# Patient Record
Sex: MALE | Race: BLACK OR AFRICAN AMERICAN | Employment: UNEMPLOYED | URBAN - METROPOLITAN AREA
[De-identification: names, ages, dates, MRNs, and addresses within clinical notes are randomized per-mention and may not be internally consistent; named-entity substitution may affect disease eponyms.]

---

## 2022-01-01 ENCOUNTER — HOSPITAL ENCOUNTER (INPATIENT)
Facility: CLINIC | Age: 42
LOS: 4 days | Discharge: HOME | DRG: 637 | End: 2022-01-06
Attending: EMERGENCY MEDICINE | Admitting: FAMILY MEDICINE
Payer: COMMERCIAL

## 2022-01-01 DIAGNOSIS — H66.012 NON-RECURRENT ACUTE SUPPURATIVE OTITIS MEDIA OF LEFT EAR WITH SPONTANEOUS RUPTURE OF TYMPANIC MEMBRANE: ICD-10-CM

## 2022-01-01 DIAGNOSIS — E11.9 TYPE 2 DIABETES MELLITUS WITHOUT COMPLICATION, WITH LONG-TERM CURRENT USE OF INSULIN (H): ICD-10-CM

## 2022-01-01 DIAGNOSIS — R76.8 COVID-19 VIRUS ANTIBODY DETECTED: ICD-10-CM

## 2022-01-01 DIAGNOSIS — H70.92 MASTOIDITIS OF LEFT SIDE: Primary | ICD-10-CM

## 2022-01-01 DIAGNOSIS — U07.1 LAB TEST POSITIVE FOR DETECTION OF COVID-19 VIRUS: ICD-10-CM

## 2022-01-01 DIAGNOSIS — Z79.4 TYPE 2 DIABETES MELLITUS WITHOUT COMPLICATION, WITH LONG-TERM CURRENT USE OF INSULIN (H): ICD-10-CM

## 2022-01-01 DIAGNOSIS — R73.9 HYPERGLYCEMIA: ICD-10-CM

## 2022-01-01 DIAGNOSIS — F17.200 TOBACCO USE DISORDER: ICD-10-CM

## 2022-01-01 DIAGNOSIS — G57.93 NEUROPATHIC PAIN OF BOTH FEET: ICD-10-CM

## 2022-01-01 LAB
ABO/RH(D): NORMAL
ALBUMIN SERPL-MCNC: 2.6 G/DL (ref 3.4–5)
ALP SERPL-CCNC: 95 U/L (ref 40–150)
ALT SERPL W P-5'-P-CCNC: 19 U/L (ref 0–70)
ANION GAP SERPL CALCULATED.3IONS-SCNC: 6 MMOL/L (ref 3–14)
APTT PPP: 30 SECONDS (ref 22–38)
AST SERPL W P-5'-P-CCNC: 10 U/L (ref 0–45)
BASOPHILS # BLD AUTO: 0 10E3/UL (ref 0–0.2)
BASOPHILS NFR BLD AUTO: 0 %
BILIRUB SERPL-MCNC: 0.2 MG/DL (ref 0.2–1.3)
BUN SERPL-MCNC: 12 MG/DL (ref 7–30)
CALCIUM SERPL-MCNC: 9.8 MG/DL (ref 8.5–10.1)
CHLORIDE BLD-SCNC: 91 MMOL/L (ref 94–109)
CO2 SERPL-SCNC: 33 MMOL/L (ref 20–32)
CREAT SERPL-MCNC: 0.55 MG/DL (ref 0.66–1.25)
CRP SERPL-MCNC: 210 MG/L (ref 0–8)
D DIMER PPP FEU-MCNC: 0.46 UG/ML FEU (ref 0–0.5)
EOSINOPHIL # BLD AUTO: 0 10E3/UL (ref 0–0.7)
EOSINOPHIL NFR BLD AUTO: 0 %
ERYTHROCYTE [DISTWIDTH] IN BLOOD BY AUTOMATED COUNT: 11.9 % (ref 10–15)
FERRITIN SERPL-MCNC: 379 NG/ML (ref 26–388)
FIBRINOGEN PPP-MCNC: 764 MG/DL (ref 170–490)
GFR SERPL CREATININE-BSD FRML MDRD: >90 ML/MIN/1.73M2
GLUCOSE BLD-MCNC: 604 MG/DL (ref 70–99)
GLUCOSE BLDC GLUCOMTR-MCNC: 368 MG/DL (ref 70–99)
HBA1C MFR BLD: 13.7 % (ref 0–5.6)
HCT VFR BLD AUTO: 43.2 % (ref 40–53)
HGB BLD-MCNC: 14.6 G/DL (ref 13.3–17.7)
IMM GRANULOCYTES # BLD: 0 10E3/UL
IMM GRANULOCYTES NFR BLD: 0 %
INR PPP: 1.08 (ref 0.85–1.15)
LDH SERPL L TO P-CCNC: 145 U/L (ref 85–227)
LYMPHOCYTES # BLD AUTO: 1.4 10E3/UL (ref 0.8–5.3)
LYMPHOCYTES NFR BLD AUTO: 20 %
MCH RBC QN AUTO: 29.9 PG (ref 26.5–33)
MCHC RBC AUTO-ENTMCNC: 33.8 G/DL (ref 31.5–36.5)
MCV RBC AUTO: 89 FL (ref 78–100)
MONOCYTES # BLD AUTO: 0.8 10E3/UL (ref 0–1.3)
MONOCYTES NFR BLD AUTO: 12 %
NEUTROPHILS # BLD AUTO: 4.7 10E3/UL (ref 1.6–8.3)
NEUTROPHILS NFR BLD AUTO: 68 %
NRBC # BLD AUTO: 0 10E3/UL
NRBC BLD AUTO-RTO: 0 /100
PLATELET # BLD AUTO: 291 10E3/UL (ref 150–450)
POTASSIUM BLD-SCNC: 4.3 MMOL/L (ref 3.4–5.3)
PROT SERPL-MCNC: 7.4 G/DL (ref 6.8–8.8)
RBC # BLD AUTO: 4.88 10E6/UL (ref 4.4–5.9)
RETICS # AUTO: 0.03 10E6/UL (ref 0.03–0.1)
RETICS/RBC NFR AUTO: 0.7 % (ref 0.5–2)
SARS-COV-2 RNA RESP QL NAA+PROBE: POSITIVE
SODIUM SERPL-SCNC: 130 MMOL/L (ref 133–144)
SPECIMEN EXPIRATION DATE: NORMAL
TROPONIN I SERPL HS-MCNC: <3 NG/L
WBC # BLD AUTO: 6.9 10E3/UL (ref 4–11)

## 2022-01-01 PROCEDURE — 99284 EMERGENCY DEPT VISIT MOD MDM: CPT | Performed by: EMERGENCY MEDICINE

## 2022-01-01 PROCEDURE — 85610 PROTHROMBIN TIME: CPT | Performed by: STUDENT IN AN ORGANIZED HEALTH CARE EDUCATION/TRAINING PROGRAM

## 2022-01-01 PROCEDURE — U0005 INFEC AGEN DETEC AMPLI PROBE: HCPCS | Performed by: EMERGENCY MEDICINE

## 2022-01-01 PROCEDURE — 250N000013 HC RX MED GY IP 250 OP 250 PS 637: Performed by: EMERGENCY MEDICINE

## 2022-01-01 PROCEDURE — 82962 GLUCOSE BLOOD TEST: CPT

## 2022-01-01 PROCEDURE — 96365 THER/PROPH/DIAG IV INF INIT: CPT | Performed by: EMERGENCY MEDICINE

## 2022-01-01 PROCEDURE — 36415 COLL VENOUS BLD VENIPUNCTURE: CPT | Performed by: EMERGENCY MEDICINE

## 2022-01-01 PROCEDURE — G0378 HOSPITAL OBSERVATION PER HR: HCPCS

## 2022-01-01 PROCEDURE — 86140 C-REACTIVE PROTEIN: CPT | Performed by: STUDENT IN AN ORGANIZED HEALTH CARE EDUCATION/TRAINING PROGRAM

## 2022-01-01 PROCEDURE — 85045 AUTOMATED RETICULOCYTE COUNT: CPT | Performed by: STUDENT IN AN ORGANIZED HEALTH CARE EDUCATION/TRAINING PROGRAM

## 2022-01-01 PROCEDURE — 82728 ASSAY OF FERRITIN: CPT | Performed by: STUDENT IN AN ORGANIZED HEALTH CARE EDUCATION/TRAINING PROGRAM

## 2022-01-01 PROCEDURE — 250N000009 HC RX 250: Performed by: STUDENT IN AN ORGANIZED HEALTH CARE EDUCATION/TRAINING PROGRAM

## 2022-01-01 PROCEDURE — 85379 FIBRIN DEGRADATION QUANT: CPT | Performed by: STUDENT IN AN ORGANIZED HEALTH CARE EDUCATION/TRAINING PROGRAM

## 2022-01-01 PROCEDURE — 83036 HEMOGLOBIN GLYCOSYLATED A1C: CPT | Performed by: EMERGENCY MEDICINE

## 2022-01-01 PROCEDURE — 96361 HYDRATE IV INFUSION ADD-ON: CPT | Performed by: EMERGENCY MEDICINE

## 2022-01-01 PROCEDURE — C9803 HOPD COVID-19 SPEC COLLECT: HCPCS | Performed by: EMERGENCY MEDICINE

## 2022-01-01 PROCEDURE — 85004 AUTOMATED DIFF WBC COUNT: CPT | Performed by: EMERGENCY MEDICINE

## 2022-01-01 PROCEDURE — 258N000003 HC RX IP 258 OP 636: Performed by: STUDENT IN AN ORGANIZED HEALTH CARE EDUCATION/TRAINING PROGRAM

## 2022-01-01 PROCEDURE — 250N000009 HC RX 250: Performed by: NURSE PRACTITIONER

## 2022-01-01 PROCEDURE — 86901 BLOOD TYPING SEROLOGIC RH(D): CPT | Performed by: STUDENT IN AN ORGANIZED HEALTH CARE EDUCATION/TRAINING PROGRAM

## 2022-01-01 PROCEDURE — 80053 COMPREHEN METABOLIC PANEL: CPT | Performed by: EMERGENCY MEDICINE

## 2022-01-01 PROCEDURE — 85730 THROMBOPLASTIN TIME PARTIAL: CPT | Performed by: STUDENT IN AN ORGANIZED HEALTH CARE EDUCATION/TRAINING PROGRAM

## 2022-01-01 PROCEDURE — 36415 COLL VENOUS BLD VENIPUNCTURE: CPT | Performed by: STUDENT IN AN ORGANIZED HEALTH CARE EDUCATION/TRAINING PROGRAM

## 2022-01-01 PROCEDURE — 84484 ASSAY OF TROPONIN QUANT: CPT | Performed by: STUDENT IN AN ORGANIZED HEALTH CARE EDUCATION/TRAINING PROGRAM

## 2022-01-01 PROCEDURE — 85384 FIBRINOGEN ACTIVITY: CPT | Performed by: STUDENT IN AN ORGANIZED HEALTH CARE EDUCATION/TRAINING PROGRAM

## 2022-01-01 PROCEDURE — 258N000003 HC RX IP 258 OP 636: Performed by: EMERGENCY MEDICINE

## 2022-01-01 PROCEDURE — 99285 EMERGENCY DEPT VISIT HI MDM: CPT | Mod: 25 | Performed by: EMERGENCY MEDICINE

## 2022-01-01 PROCEDURE — 83615 LACTATE (LD) (LDH) ENZYME: CPT | Performed by: STUDENT IN AN ORGANIZED HEALTH CARE EDUCATION/TRAINING PROGRAM

## 2022-01-01 PROCEDURE — 96366 THER/PROPH/DIAG IV INF ADDON: CPT | Performed by: EMERGENCY MEDICINE

## 2022-01-01 RX ORDER — DEXTROSE MONOHYDRATE 100 MG/ML
INJECTION, SOLUTION INTRAVENOUS CONTINUOUS PRN
Status: DISCONTINUED | OUTPATIENT
Start: 2022-01-01 | End: 2022-01-02

## 2022-01-01 RX ORDER — DEXTROSE MONOHYDRATE 25 G/50ML
25-50 INJECTION, SOLUTION INTRAVENOUS
Status: DISCONTINUED | OUTPATIENT
Start: 2022-01-01 | End: 2022-01-02

## 2022-01-01 RX ORDER — IBUPROFEN 600 MG/1
600 TABLET, FILM COATED ORAL 3 TIMES DAILY PRN
Status: DISCONTINUED | OUTPATIENT
Start: 2022-01-01 | End: 2022-01-01

## 2022-01-01 RX ORDER — AMOXICILLIN AND CLAVULANATE POTASSIUM 500; 125 MG/1; MG/1
1 TABLET, FILM COATED ORAL 3 TIMES DAILY
Status: DISCONTINUED | OUTPATIENT
Start: 2022-01-01 | End: 2022-01-01

## 2022-01-01 RX ORDER — ACETAMINOPHEN 500 MG
1000 TABLET ORAL ONCE
Status: COMPLETED | OUTPATIENT
Start: 2022-01-01 | End: 2022-01-01

## 2022-01-01 RX ORDER — LIDOCAINE 40 MG/G
CREAM TOPICAL
Status: DISCONTINUED | OUTPATIENT
Start: 2022-01-01 | End: 2022-01-02

## 2022-01-01 RX ORDER — POLYETHYLENE GLYCOL 3350 17 G/17G
17 POWDER, FOR SOLUTION ORAL DAILY PRN
Status: DISCONTINUED | OUTPATIENT
Start: 2022-01-01 | End: 2022-01-06 | Stop reason: HOSPADM

## 2022-01-01 RX ORDER — DEXTROSE MONOHYDRATE 25 G/50ML
25-50 INJECTION, SOLUTION INTRAVENOUS
Status: DISCONTINUED | OUTPATIENT
Start: 2022-01-01 | End: 2022-01-01

## 2022-01-01 RX ORDER — ONDANSETRON 2 MG/ML
4 INJECTION INTRAMUSCULAR; INTRAVENOUS EVERY 6 HOURS PRN
Status: DISCONTINUED | OUTPATIENT
Start: 2022-01-01 | End: 2022-01-06 | Stop reason: HOSPADM

## 2022-01-01 RX ORDER — ACETAMINOPHEN 325 MG/1
650 TABLET ORAL EVERY 6 HOURS PRN
Status: DISCONTINUED | OUTPATIENT
Start: 2022-01-01 | End: 2022-01-06 | Stop reason: HOSPADM

## 2022-01-01 RX ORDER — CALCIUM CARBONATE 500 MG/1
1000 TABLET, CHEWABLE ORAL 4 TIMES DAILY PRN
Status: DISCONTINUED | OUTPATIENT
Start: 2022-01-01 | End: 2022-01-06 | Stop reason: HOSPADM

## 2022-01-01 RX ORDER — SODIUM CHLORIDE 9 MG/ML
INJECTION, SOLUTION INTRAVENOUS CONTINUOUS
Status: DISCONTINUED | OUTPATIENT
Start: 2022-01-01 | End: 2022-01-02

## 2022-01-01 RX ORDER — NICOTINE POLACRILEX 4 MG
15-30 LOZENGE BUCCAL
Status: DISCONTINUED | OUTPATIENT
Start: 2022-01-01 | End: 2022-01-02

## 2022-01-01 RX ORDER — SODIUM CHLORIDE 9 MG/ML
INJECTION, SOLUTION INTRAVENOUS CONTINUOUS
Status: DISCONTINUED | OUTPATIENT
Start: 2022-01-01 | End: 2022-01-01

## 2022-01-01 RX ORDER — LIDOCAINE 40 MG/G
CREAM TOPICAL
Status: DISCONTINUED | OUTPATIENT
Start: 2022-01-01 | End: 2022-01-06 | Stop reason: HOSPADM

## 2022-01-01 RX ORDER — DEXTROSE MONOHYDRATE 100 MG/ML
INJECTION, SOLUTION INTRAVENOUS CONTINUOUS PRN
Status: DISCONTINUED | OUTPATIENT
Start: 2022-01-01 | End: 2022-01-01

## 2022-01-01 RX ORDER — NICOTINE POLACRILEX 4 MG
15-30 LOZENGE BUCCAL
Status: DISCONTINUED | OUTPATIENT
Start: 2022-01-01 | End: 2022-01-01

## 2022-01-01 RX ORDER — ACETAMINOPHEN 325 MG/1
650 TABLET ORAL EVERY 6 HOURS PRN
Status: DISCONTINUED | OUTPATIENT
Start: 2022-01-01 | End: 2022-01-01

## 2022-01-01 RX ORDER — ONDANSETRON 4 MG/1
4 TABLET, ORALLY DISINTEGRATING ORAL EVERY 6 HOURS PRN
Status: DISCONTINUED | OUTPATIENT
Start: 2022-01-01 | End: 2022-01-06 | Stop reason: HOSPADM

## 2022-01-01 RX ADMIN — SODIUM CHLORIDE: 9 INJECTION, SOLUTION INTRAVENOUS at 17:55

## 2022-01-01 RX ADMIN — ACETAMINOPHEN 1000 MG: 500 TABLET ORAL at 15:39

## 2022-01-01 RX ADMIN — AMOXICILLIN AND CLAVULANATE POTASSIUM 1 TABLET: 875; 125 TABLET, FILM COATED ORAL at 15:39

## 2022-01-01 RX ADMIN — SODIUM CHLORIDE 1000 ML: 9 INJECTION, SOLUTION INTRAVENOUS at 17:17

## 2022-01-01 RX ADMIN — HUMAN INSULIN 9 UNITS/HR: 100 INJECTION, SOLUTION SUBCUTANEOUS at 20:59

## 2022-01-01 RX ADMIN — HUMAN INSULIN 7 UNITS/HR: 100 INJECTION, SOLUTION SUBCUTANEOUS at 20:04

## 2022-01-01 RX ADMIN — SODIUM CHLORIDE: 9 INJECTION, SOLUTION INTRAVENOUS at 21:32

## 2022-01-01 RX ADMIN — HUMAN INSULIN 3 UNITS/HR: 100 INJECTION, SOLUTION SUBCUTANEOUS at 21:31

## 2022-01-01 RX ADMIN — HUMAN INSULIN 5 UNITS/HR: 100 INJECTION, SOLUTION SUBCUTANEOUS at 17:54

## 2022-01-01 ASSESSMENT — ENCOUNTER SYMPTOMS
SHORTNESS OF BREATH: 0
COUGH: 0
NAUSEA: 0
VOMITING: 0

## 2022-01-01 ASSESSMENT — MIFFLIN-ST. JEOR: SCORE: 1509.07

## 2022-01-01 NOTE — LETTER
[facility] [address] [address]  [phone]  [phone]      1/6/2022    Re: Mahsa Torrez      TO WHOM IT MAY CONCERN:    Mahsa Torrez  was in the hospital from 1/3/2022-1/6/2022.  Please excuse him from work until 1/13/2022 due to illness. Please call if you have further questions or concerns.       Cordially,    May Mai MD

## 2022-01-01 NOTE — ED PROVIDER NOTES
[place] EMERGENCY DEPARTMENT ([facility])  1/01/22  History     Chief Complaint   Patient presents with     Otalgia     Dizziness     The history is provided by the patient and medical records.     Mahsa Torrez is a 42 year old male who presents to the emerge department with complaints of right ear pain and drainage.  Patient states he flew [place] to [place] over the last week and when landing in [place] he felt his ear pop on the left side.  Patient apparently was seen in an emergency department there and given antibiotics but only took 1 dose while in the ER.  The patient states that since that time he has had persistent drainage. He has had generalized malaise, posterior ear pain on the left. No nausea, no vomiting, no coughing. He states that he has had a negative Covid test for travel and he denies any shortness of breath.  Patient does state that his feet burn bilaterally.  Patient denies any history of diabetes but is unsure if he has any other underlying illnesses.    No past medical history on file.    No past surgical history on file.    No family history on file.    Social History     Tobacco Use     Smoking status: Current Some Day Smoker     Smokeless tobacco: Never Used   Substance Use Topics     Alcohol use: Not on file      No Known Allergies        Review of your medicines      None currently       I have reviewed the Medications, Allergies, Past Medical and Surgical History, and Social History in the Epic system.    Review of Systems   Constitutional:        Positive for malaise     HENT: Positive for ear discharge (L) and ear pain (L).    Respiratory: Negative for cough and shortness of breath.    Gastrointestinal: Negative for nausea and vomiting.   Musculoskeletal:        Positive for bilateral feet burning   All other systems reviewed and are negative.    A complete review of systems was performed with pertinent positives and negatives noted in the HPI, and all other  "systems negative.    Physical Exam   BP: 138/85  Pulse: 104  Temp: 99.9  F (37.7  C)  Resp: 16  Height: 167.6 cm (5' 6\")  Weight: 67.1 kg (148 lb)  SpO2: 96 %      Physical Exam  Vitals reviewed.   Constitutional:       Appearance: He is not ill-appearing or diaphoretic.      Comments: Conversant pleasant   HENT:      Right Ear: Tympanic membrane normal.      Ears:      Comments: Patient's left ear has copious amounts of discharge in the canal and the TM is unable to be visualized but most likely is spontaneously ruptured     Mouth/Throat:      Pharynx: Oropharynx is clear.   Eyes:      Extraocular Movements: Extraocular movements intact.      Pupils: Pupils are equal, round, and reactive to light.   Cardiovascular:      Rate and Rhythm: Regular rhythm.      Heart sounds: Normal heart sounds.   Musculoskeletal:         General: No deformity.      Cervical back: Neck supple.   Neurological:      General: No focal deficit present.      Mental Status: He is alert and oriented to person, place, and time.   Psychiatric:         Mood and Affect: Mood normal.         ED Course     At 3:51 PM the patient was seen and examined by Jaun Ruiz MD in Room ED07.        Procedures         Patient was initially given an oral dose of Augmentin and because of his feet paresthesias with burning did have laboratories done    Results for orders placed or performed during the hospital encounter of 01/01/22 (from the past 24 hour(s))   CBC with platelets differential    Narrative    The following orders were created for panel order CBC with platelets differential.  Procedure                               Abnormality         Status                     ---------                               -----------         ------                     CBC with platelets and d...[[id number]]                      Final result                 Please view results for these tests on the individual orders.   Comprehensive metabolic panel   Result " Value Ref Range    Sodium 130 (L) 133 - 144 mmol/L    Potassium 4.3 3.4 - 5.3 mmol/L    Chloride 91 (L) 94 - 109 mmol/L    Carbon Dioxide (CO2) 33 (H) 20 - 32 mmol/L    Anion Gap 6 3 - 14 mmol/L    Urea Nitrogen 12 7 - 30 mg/dL    Creatinine 0.55 (L) 0.66 - 1.25 mg/dL    Calcium 9.8 8.5 - 10.1 mg/dL    Glucose 604 (HH) 70 - 99 mg/dL    Alkaline Phosphatase 95 40 - 150 U/L    AST 10 0 - 45 U/L    ALT 19 0 - 70 U/L    Protein Total 7.4 6.8 - 8.8 g/dL    Albumin 2.6 (L) 3.4 - 5.0 g/dL    Bilirubin Total 0.2 0.2 - 1.3 mg/dL    GFR Estimate >90 >60 mL/min/1.73m2   CBC with platelets and differential   Result Value Ref Range    WBC Count 6.9 4.0 - 11.0 10e3/uL    RBC Count 4.88 4.40 - 5.90 10e6/uL    Hemoglobin 14.6 13.3 - 17.7 g/dL    Hematocrit 43.2 40.0 - 53.0 %    MCV 89 78 - 100 fL    MCH 29.9 26.5 - 33.0 pg    MCHC 33.8 31.5 - 36.5 g/dL    RDW 11.9 10.0 - 15.0 %    Platelet Count 291 150 - 450 10e3/uL    % Neutrophils 68 %    % Lymphocytes 20 %    % Monocytes 12 %    % Eosinophils 0 %    % Basophils 0 %    % Immature Granulocytes 0 %    NRBCs per 100 WBC 0 <1 /100    Absolute Neutrophils 4.7 1.6 - 8.3 10e3/uL    Absolute Lymphocytes 1.4 0.8 - 5.3 10e3/uL    Absolute Monocytes 0.8 0.0 - 1.3 10e3/uL    Absolute Eosinophils 0.0 0.0 - 0.7 10e3/uL    Absolute Basophils 0.0 0.0 - 0.2 10e3/uL    Absolute Immature Granulocytes 0.0 <=0.4 10e3/uL    Absolute NRBCs 0.0 10e3/uL       Medications   0.9% sodium chloride BOLUS (has no administration in time range)   sodium chloride 0.9% infusion (has no administration in time range)   amoxicillin-clavulanate (AUGMENTIN) 500-125 MG per tablet 1 tablet (has no administration in time range)   acetaminophen (TYLENOL) tablet 650 mg (has no administration in time range)   ibuprofen (ADVIL/MOTRIN) tablet 600 mg (has no administration in time range)   amoxicillin-clavulanate (AUGMENTIN) 875-125 MG per tablet 1 tablet (1 tablet Oral Given 1/1/22 8783)   acetaminophen (TYLENOL) tablet 1,000  mg (1,000 mg Oral Given 1/1/22 1539)              Assessments & Plan (with Medical Decision Making)     I have reviewed the nursing notes.    After the first dose of Augmentin the patient's labs came back showing a glucose of 600.  Most likely this represents new onset diabetes as the patient has no history that he is aware of of having diabetes.  Patient will be kept in our observation unit and be given some IV fluids here and started on insulin drip to help assess his insulin requirements.  Hemoglobin A1c was added to his labs as well.    I have reviewed the findings, diagnosis, and plan with the patient.        Final diagnoses:   Non-recurrent acute suppurative otitis media of left ear with spontaneous rupture of tympanic membrane   Hyperglycemia     Jaun Ruiz MD, MD    IAbi am serving as a trained medical scribe to document services personally performed by Jaun Ruiz MD, based on the provider's statements to me.      IJaun MD, was physically present and have reviewed and verified the accuracy of this note documented by Abi Montez.     Jaun Ruiz MD  1/1/2022   [facility] EMERGENCY DEPARTMENT     Jaun Ruiz MD  01/01/22 1710      Addendum 1920  Patient's Covid test came back positive so at this point the patient will be admitted to a medicine observation bed.    Final diagnoses:   Non-recurrent acute suppurative otitis media of left ear with spontaneous rupture of tympanic membrane   Hyperglycemia - new onset diabetes   COVID-19 virus antibody detected     Jaun Ruiz MD, Jaun Sotomayor MD  01/01/22 1922

## 2022-01-01 NOTE — ED TRIAGE NOTES
"Patient presents ambulatory to triage with complaints of left ear pain, headache and dizziness. Patient reports that he also has back pain and \"feet burning.\" Patient reports symptoms started one week ago while he was in [place] for which he was seen in an ER. Patient reports that he was given a dose of antibiotics at the hospital, but never received a prescription for further treatment.   "

## 2022-01-01 NOTE — LETTER
January 2, 2022      Mahsa Torrez  [address] [address]        To Whom it May Concern,    Mr. Mahsa Torrez was hospitalized from 1/1/22- ***. He will require isolation until 1/11/21

## 2022-01-02 ENCOUNTER — APPOINTMENT (OUTPATIENT)
Dept: CT IMAGING | Facility: CLINIC | Age: 42
DRG: 637 | End: 2022-01-02
Attending: STUDENT IN AN ORGANIZED HEALTH CARE EDUCATION/TRAINING PROGRAM

## 2022-01-02 PROBLEM — H70.92 MASTOIDITIS OF LEFT SIDE: Status: ACTIVE | Noted: 2022-01-02

## 2022-01-02 LAB
ANION GAP SERPL CALCULATED.3IONS-SCNC: 9 MMOL/L (ref 3–14)
BUN SERPL-MCNC: 8 MG/DL (ref 7–30)
CALCIUM SERPL-MCNC: 9.2 MG/DL (ref 8.5–10.1)
CHLORIDE BLD-SCNC: 100 MMOL/L (ref 94–109)
CHOLEST SERPL-MCNC: 178 MG/DL
CO2 SERPL-SCNC: 27 MMOL/L (ref 20–32)
CREAT SERPL-MCNC: 0.47 MG/DL (ref 0.66–1.25)
CREAT UR-MCNC: 42 MG/DL
ERYTHROCYTE [DISTWIDTH] IN BLOOD BY AUTOMATED COUNT: 12 % (ref 10–15)
GFR SERPL CREATININE-BSD FRML MDRD: >90 ML/MIN/1.73M2
GLUCOSE BLD-MCNC: 203 MG/DL (ref 70–99)
GLUCOSE BLDC GLUCOMTR-MCNC: 115 MG/DL (ref 70–99)
GLUCOSE BLDC GLUCOMTR-MCNC: 118 MG/DL (ref 70–99)
GLUCOSE BLDC GLUCOMTR-MCNC: 119 MG/DL (ref 70–99)
GLUCOSE BLDC GLUCOMTR-MCNC: 144 MG/DL (ref 70–99)
GLUCOSE BLDC GLUCOMTR-MCNC: 173 MG/DL (ref 70–99)
GLUCOSE BLDC GLUCOMTR-MCNC: 208 MG/DL (ref 70–99)
GLUCOSE BLDC GLUCOMTR-MCNC: 254 MG/DL (ref 70–99)
GLUCOSE BLDC GLUCOMTR-MCNC: 271 MG/DL (ref 70–99)
GLUCOSE BLDC GLUCOMTR-MCNC: 286 MG/DL (ref 70–99)
GLUCOSE BLDC GLUCOMTR-MCNC: 291 MG/DL (ref 70–99)
GLUCOSE BLDC GLUCOMTR-MCNC: 308 MG/DL (ref 70–99)
GLUCOSE BLDC GLUCOMTR-MCNC: 308 MG/DL (ref 70–99)
GLUCOSE BLDC GLUCOMTR-MCNC: 386 MG/DL (ref 70–99)
HCT VFR BLD AUTO: 41.9 % (ref 40–53)
HDLC SERPL-MCNC: 44 MG/DL
HGB BLD-MCNC: 14 G/DL (ref 13.3–17.7)
LDLC SERPL CALC-MCNC: 119 MG/DL
MCH RBC QN AUTO: 29.9 PG (ref 26.5–33)
MCHC RBC AUTO-ENTMCNC: 33.4 G/DL (ref 31.5–36.5)
MCV RBC AUTO: 89 FL (ref 78–100)
MICROALBUMIN UR-MCNC: 9 MG/L
MICROALBUMIN/CREAT UR: 21.43 MG/G CR (ref 0–17)
NONHDLC SERPL-MCNC: 134 MG/DL
PLATELET # BLD AUTO: 290 10E3/UL (ref 150–450)
POTASSIUM BLD-SCNC: 3.9 MMOL/L (ref 3.4–5.3)
RBC # BLD AUTO: 4.69 10E6/UL (ref 4.4–5.9)
SODIUM SERPL-SCNC: 136 MMOL/L (ref 133–144)
TRIGL SERPL-MCNC: 75 MG/DL
WBC # BLD AUTO: 7.5 10E3/UL (ref 4–11)

## 2022-01-02 PROCEDURE — 36415 COLL VENOUS BLD VENIPUNCTURE: CPT | Performed by: STUDENT IN AN ORGANIZED HEALTH CARE EDUCATION/TRAINING PROGRAM

## 2022-01-02 PROCEDURE — 87077 CULTURE AEROBIC IDENTIFY: CPT | Performed by: STUDENT IN AN ORGANIZED HEALTH CARE EDUCATION/TRAINING PROGRAM

## 2022-01-02 PROCEDURE — 120N000002 HC R&B MED SURG/OB UMMC

## 2022-01-02 PROCEDURE — 70450 CT HEAD/BRAIN W/O DYE: CPT | Mod: 26 | Performed by: RADIOLOGY

## 2022-01-02 PROCEDURE — 99223 1ST HOSP IP/OBS HIGH 75: CPT | Mod: AI | Performed by: FAMILY MEDICINE

## 2022-01-02 PROCEDURE — 82962 GLUCOSE BLOOD TEST: CPT

## 2022-01-02 PROCEDURE — G0378 HOSPITAL OBSERVATION PER HR: HCPCS

## 2022-01-02 PROCEDURE — 82043 UR ALBUMIN QUANTITATIVE: CPT | Performed by: STUDENT IN AN ORGANIZED HEALTH CARE EDUCATION/TRAINING PROGRAM

## 2022-01-02 PROCEDURE — 250N000013 HC RX MED GY IP 250 OP 250 PS 637: Performed by: STUDENT IN AN ORGANIZED HEALTH CARE EDUCATION/TRAINING PROGRAM

## 2022-01-02 PROCEDURE — 250N000012 HC RX MED GY IP 250 OP 636 PS 637: Performed by: STUDENT IN AN ORGANIZED HEALTH CARE EDUCATION/TRAINING PROGRAM

## 2022-01-02 PROCEDURE — 250N000009 HC RX 250: Performed by: STUDENT IN AN ORGANIZED HEALTH CARE EDUCATION/TRAINING PROGRAM

## 2022-01-02 PROCEDURE — 70450 CT HEAD/BRAIN W/O DYE: CPT

## 2022-01-02 PROCEDURE — 250N000011 HC RX IP 250 OP 636: Performed by: FAMILY MEDICINE

## 2022-01-02 PROCEDURE — 250N000011 HC RX IP 250 OP 636: Performed by: STUDENT IN AN ORGANIZED HEALTH CARE EDUCATION/TRAINING PROGRAM

## 2022-01-02 PROCEDURE — 86341 ISLET CELL ANTIBODY: CPT | Performed by: STUDENT IN AN ORGANIZED HEALTH CARE EDUCATION/TRAINING PROGRAM

## 2022-01-02 PROCEDURE — 96372 THER/PROPH/DIAG INJ SC/IM: CPT | Performed by: STUDENT IN AN ORGANIZED HEALTH CARE EDUCATION/TRAINING PROGRAM

## 2022-01-02 PROCEDURE — 70481 CT ORBIT/EAR/FOSSA W/DYE: CPT

## 2022-01-02 PROCEDURE — 96367 TX/PROPH/DG ADDL SEQ IV INF: CPT | Performed by: EMERGENCY MEDICINE

## 2022-01-02 PROCEDURE — 96366 THER/PROPH/DIAG IV INF ADDON: CPT | Performed by: EMERGENCY MEDICINE

## 2022-01-02 PROCEDURE — 85027 COMPLETE CBC AUTOMATED: CPT | Performed by: STUDENT IN AN ORGANIZED HEALTH CARE EDUCATION/TRAINING PROGRAM

## 2022-01-02 PROCEDURE — 84681 ASSAY OF C-PEPTIDE: CPT | Performed by: STUDENT IN AN ORGANIZED HEALTH CARE EDUCATION/TRAINING PROGRAM

## 2022-01-02 PROCEDURE — 70481 CT ORBIT/EAR/FOSSA W/DYE: CPT | Mod: 26 | Performed by: RADIOLOGY

## 2022-01-02 PROCEDURE — 258N000003 HC RX IP 258 OP 636: Performed by: STUDENT IN AN ORGANIZED HEALTH CARE EDUCATION/TRAINING PROGRAM

## 2022-01-02 PROCEDURE — 96372 THER/PROPH/DIAG INJ SC/IM: CPT | Mod: 59

## 2022-01-02 PROCEDURE — 80061 LIPID PANEL: CPT | Performed by: STUDENT IN AN ORGANIZED HEALTH CARE EDUCATION/TRAINING PROGRAM

## 2022-01-02 PROCEDURE — 80048 BASIC METABOLIC PNL TOTAL CA: CPT | Performed by: STUDENT IN AN ORGANIZED HEALTH CARE EDUCATION/TRAINING PROGRAM

## 2022-01-02 PROCEDURE — XW033E5 INTRODUCTION OF REMDESIVIR ANTI-INFECTIVE INTO PERIPHERAL VEIN, PERCUTANEOUS APPROACH, NEW TECHNOLOGY GROUP 5: ICD-10-PCS | Performed by: FAMILY MEDICINE

## 2022-01-02 PROCEDURE — 87070 CULTURE OTHR SPECIMN AEROBIC: CPT | Performed by: STUDENT IN AN ORGANIZED HEALTH CARE EDUCATION/TRAINING PROGRAM

## 2022-01-02 RX ORDER — CIPROFLOXACIN AND DEXAMETHASONE 3; 1 MG/ML; MG/ML
4 SUSPENSION/ DROPS AURICULAR (OTIC) 2 TIMES DAILY
Status: DISCONTINUED | OUTPATIENT
Start: 2022-01-02 | End: 2022-01-06 | Stop reason: HOSPADM

## 2022-01-02 RX ORDER — GABAPENTIN 300 MG/1
300 CAPSULE ORAL 3 TIMES DAILY
Status: DISCONTINUED | OUTPATIENT
Start: 2022-01-02 | End: 2022-01-06 | Stop reason: HOSPADM

## 2022-01-02 RX ORDER — DEXTROSE MONOHYDRATE 25 G/50ML
25-50 INJECTION, SOLUTION INTRAVENOUS
Status: DISCONTINUED | OUTPATIENT
Start: 2022-01-02 | End: 2022-01-06 | Stop reason: HOSPADM

## 2022-01-02 RX ORDER — NICOTINE POLACRILEX 4 MG
15-30 LOZENGE BUCCAL
Status: DISCONTINUED | OUTPATIENT
Start: 2022-01-02 | End: 2022-01-06 | Stop reason: HOSPADM

## 2022-01-02 RX ORDER — PIPERACILLIN SODIUM, TAZOBACTAM SODIUM 4; .5 G/20ML; G/20ML
4.5 INJECTION, POWDER, LYOPHILIZED, FOR SOLUTION INTRAVENOUS EVERY 6 HOURS
Status: DISCONTINUED | OUTPATIENT
Start: 2022-01-02 | End: 2022-01-04

## 2022-01-02 RX ORDER — IOPAMIDOL 755 MG/ML
75 INJECTION, SOLUTION INTRAVASCULAR ONCE
Status: COMPLETED | OUTPATIENT
Start: 2022-01-02 | End: 2022-01-02

## 2022-01-02 RX ADMIN — INSULIN GLARGINE 10 UNITS: 100 INJECTION, SOLUTION SUBCUTANEOUS at 03:12

## 2022-01-02 RX ADMIN — INSULIN ASPART 4 UNITS: 100 INJECTION, SOLUTION INTRAVENOUS; SUBCUTANEOUS at 22:04

## 2022-01-02 RX ADMIN — IOPAMIDOL 75 ML: 755 INJECTION, SOLUTION INTRAVENOUS at 15:25

## 2022-01-02 RX ADMIN — VANCOMYCIN HYDROCHLORIDE 1750 MG: 100 INJECTION, POWDER, LYOPHILIZED, FOR SOLUTION INTRAVENOUS at 13:37

## 2022-01-02 RX ADMIN — PIPERACILLIN SODIUM AND TAZOBACTAM SODIUM 4.5 G: 4; 500 INJECTION, POWDER, FOR SOLUTION INTRAVENOUS at 18:25

## 2022-01-02 RX ADMIN — SODIUM CHLORIDE 50 ML: 9 INJECTION, SOLUTION INTRAVENOUS at 12:01

## 2022-01-02 RX ADMIN — GABAPENTIN 300 MG: 300 CAPSULE ORAL at 10:18

## 2022-01-02 RX ADMIN — REMDESIVIR 200 MG: 100 INJECTION, POWDER, LYOPHILIZED, FOR SOLUTION INTRAVENOUS at 11:06

## 2022-01-02 RX ADMIN — CIPROFLOXACIN AND DEXAMETHASONE 4 DROP: 3; 1 SUSPENSION/ DROPS AURICULAR (OTIC) at 19:51

## 2022-01-02 RX ADMIN — GABAPENTIN 300 MG: 300 CAPSULE ORAL at 13:46

## 2022-01-02 RX ADMIN — ENOXAPARIN SODIUM 30 MG: 30 INJECTION SUBCUTANEOUS at 13:46

## 2022-01-02 RX ADMIN — AMOXICILLIN AND CLAVULANATE POTASSIUM 1 TABLET: 875; 125 TABLET, FILM COATED ORAL at 09:00

## 2022-01-02 RX ADMIN — GABAPENTIN 300 MG: 300 CAPSULE ORAL at 19:52

## 2022-01-02 RX ADMIN — PIPERACILLIN SODIUM AND TAZOBACTAM SODIUM 4.5 G: 4; 500 INJECTION, POWDER, FOR SOLUTION INTRAVENOUS at 12:32

## 2022-01-02 ASSESSMENT — ACTIVITIES OF DAILY LIVING (ADL)
ADLS_ACUITY_SCORE: 7

## 2022-01-02 NOTE — PROGRESS NOTES
[facility] Family Medicine Progress Note    Main Plans for Today   - start Gabapentin 300 TID  - confirmation labs for DMI vs DMII  - DM education  - aerobic and anaerobic culture of L ear drainage  - CT head w/o contrast  - IV Vanc/Zosyn with ENT consult  - urine microalbumin, lipids  - social work consult for insurance, community resources, connect with PCP  - IV Remdesevir 3d per 12/30 CDC guidelines  - DVT prophylaxis Lovenox 30mg q24h  - CT temporal bone w/ IV contrast to evaluate sigmoid sinus  - transition to inpatient    Assessment & Plan   Mahsa Torrez is a 42 year old male with no medical history admitted on 1/1 for TM perforation with discharge, hyperglycemia in the setting of new diabetes diagnosis, and found to be asymptomatic COVID positive.     # Hyperglycemia, resolved  # diabetes mellitus, new dx  # neuropathy  Patient found to have asymptomatic blood glucose of 604 on admission, A1c 13.7, not in DKA. Reports history of bilateral tingling in his feet and lower legs. Started on insulin gtt per protocol and transitioned to subQ. No prior medical history so unclear if this is DMII or Latent Autoimmune Diabetes in Adults so will initiate workup for that. Will monitor insulin needs during hospital course and discharge home on appropriate regimen.   - Lantus 10U at bedtime since will be NPO at midnght  - moderate carb diet; NPO at midnight for possible procedure tomorrow  - MIIS  - hypoglycemia protocol  - diabetes education consult  - start Gabapentin 300 TID  - lipids pending  - urine microalbumin, C peptide, glutamic acid transaminase  - follow up at [facility] for virtual visit 7 days after discharge for establishing primary care and chronic disease management     #Otomastoiditis  5 days of left ear pain following flight home from [place] followed by sebastian purulent drainage of left ear with soft tissue swelling obscuring the tympanic membrane. On  exam this morning, significant tenderness of mastoid, earlobe but not cartilage. CT head c/w acute otomastoiditis.  - switch from Augmentin to Vancomycin/Zosyn  - ENT consulted, appreciate recommendations  - NPO at midnight for possible mastoidectomy 12/3  - ciprodex drops BID x14 days  - CT temporal bone w/ IV contrast to evaluate sigmoid sinus  - Tylenol 650mg q6h prn    Microbiology:  - 1/2/22 ear drainage cx pending    Antibiotics:  - Augmentin 1/1 - 1/2  - Vancomycin 1/2 -   - Zosyn 1/2 -     # uninsured  Works at [employer] but has no health insurance. Given new diagnosis of diabetes, he will need health insurance to cover medication as well as establish with primary care for chronic disease management.  - social work consult  - follow up with [facility] for virtual visit 7 days after discharge     #COVID-19 Infection  # unvaccinated for COVID19  Incidental finding. CRP elevated at 210, d dimer normal.  Otherwise asymptomatic, 98% on room air.   - IV Remdesevir 3d per 12/30 CDC guidelines  - DVT prophylaxis Lovenox 30mg q24h  - qualifies for vaccination 10 days following positive PCR    Hyponatremia, resolved  Na 130 on admission. Resolved with PO intake    # Pain Assessment:  Current Pain Score 1/2/2022   Patient currently in pain? yes   - Mahsa is experiencing pain due to acute infection. Pain management was discussed and the plan was created in a collaborative fashion.  Mahsa's response to the current recommendations: engaged  - Please see the plan for pain management as documented above    Diet: Moderate Consistent Carb (60 g CHO per Meal) Diet  Fluids: PO  DVT Prophylaxis: Enoxaparin (Lovenox) SQ  Code Status: Full Code    Disposition Plan   Expected discharge:  2 - 3 days, recommended to prior living arrangement once antibiotic plan established, safe disposition plan/ TCU bed available and Insulin regimen for new DM diagnosis established        Entered: Dasha Hall 01/02/2022, 12:56 PM   Information in the  above section will display in the discharge planner report.      The patient's care was discussed with the Attending Physician, Dr. Sriram Farrar.    Dasha Hall  [facility]  Pager: [phone]  Please see sticky note for cross cover information    Interval History     Admitted overnight. Denies shortness of breath, trouble breathing. Spent a while discussing diabetes, neuropathy, blood glucose and insulin. He shared that he has no health insurance and no prior medical care. Would like a work note for [employer].    The 10-year ASCVD risk score (Coraopolismartha FAJARDO Jr., et al., 2013) is: 4.3%    Values used to calculate the score:      Age: 42 years      Sex: Male      Is Non- : No      Diabetic: No      Tobacco smoker: Yes      Systolic Blood Pressure: 124 mmHg      Is BP treated: No      HDL Cholesterol: 44 mg/dL      Total Cholesterol: 178 mg/dL    Physical Exam   Vital Signs: Temp: 99.1  F (37.3  C) Temp src: Oral BP: 124/81 Pulse: 85   Resp: 16 SpO2: 98 % O2 Device: None (Room air)    Weight: 148 lbs 0 oz     Physical Exam  Vitals reviewed.   Constitutional:       Appearance: Normal appearance.   HENT:      Head: Normocephalic.      Right Ear: Tympanic membrane, ear canal and external ear normal.      Left Ear: Drainage (clear/white) and tenderness present.      Ears:      Comments: TM unable to be visualized due to pain and drainage. Tenderness over L mastoid bone, pinna. No mastoid erythema or swelling, tenderness of cartilage  Eyes:      Conjunctiva/sclera: Conjunctivae normal.      Pupils: Pupils are equal, round, and reactive to light.   Cardiovascular:      Rate and Rhythm: Normal rate and regular rhythm.      Pulses: Normal pulses.      Heart sounds: Normal heart sounds.   Pulmonary:      Effort: Pulmonary effort is normal.      Breath sounds: Normal breath sounds. No wheezing, rhonchi or rales.   Abdominal:      General: Abdomen is flat.      Palpations:  Abdomen is soft. There is no mass.      Tenderness: There is no abdominal tenderness.   Musculoskeletal:         General: Normal range of motion.      Cervical back: Normal range of motion.   Lymphadenopathy:      Head:      Right side of head: No preauricular adenopathy.      Left side of head: No preauricular adenopathy.      Cervical: No cervical adenopathy.   Skin:     General: Skin is warm and dry.   Neurological:      Mental Status: He is alert and oriented to person, place, and time.   Psychiatric:         Mood and Affect: Mood normal.         Behavior: Behavior normal.         Data   Recent Labs   Lab 01/02/22  1058 01/02/22  0650 01/02/22  0624 01/01/22  2326 01/01/22  2235 01/01/22  1753 01/01/22  1544   WBC  --   --  7.5  --   --   --  6.9   HGB  --   --  14.0  --   --   --  14.6   MCV  --   --  89  --   --   --  89   PLT  --   --  290  --   --   --  291   INR  --   --   --   --  1.08  --   --    NA  --   --  136  --   --   --  130*   POTASSIUM  --   --  3.9  --   --   --  4.3   CHLORIDE  --   --  100  --   --   --  91*   CO2  --   --  27  --   --   --  33*   BUN  --   --  8  --   --   --  12   CR  --   --  0.47*  --   --   --  0.55*   ANIONGAP  --   --  9  --   --   --  6   SEBASTIÁN  --   --  9.2  --   --   --  9.8   * 208* 203*   < >  --    < > 604*   ALBUMIN  --   --   --   --   --   --  2.6*   PROTTOTAL  --   --   --   --   --   --  7.4   BILITOTAL  --   --   --   --   --   --  0.2   ALKPHOS  --   --   --   --   --   --  95   ALT  --   --   --   --   --   --  19   AST  --   --   --   --   --   --  10    < > = values in this interval not displayed.     Recent Results (from the past 24 hour(s))   CT Head w/o Contrast    Narrative    CT HEAD W/O CONTRAST 1/2/2022 10:22 AM    History: Rule out mastoiditis.     Comparison: None.    Technique: Using multidetector thin collimation helical acquisition  technique, axial, coronal and sagittal CT images from the skull base  to the vertex were obtained without  intravenous contrast.   (topogram) image(s) also obtained and reviewed.    Findings: There is no intracranial hemorrhage, mass effect, or midline  shift. Gray/white matter differentiation in both cerebral hemispheres  is preserved. Ventricles are proportionate to the cerebral sulci. The  basal cisterns are clear.    The bony calvaria and the bones of the skull base are normal.  Visualized paranasal sinuses are clear. The left mastoid air cells are  nearly completely opacified. Fluid and soft tissue thickening is seen  in the middle ear as well.      Impression    Impression:  Findings suggestive of acute otomastoiditis.

## 2022-01-02 NOTE — CONSULTS
"ENT CONSULT    Reason: left ear otitis media/mastoiditis    HPI: Mr. Torrez is a 42M presenting to the ED with 1 week of left ear pain and 4 days of drainage. He says he recently flex to [place] and had significant left ear pain during the flight. In [place] he presented to an urgent care and was not administered antibiotics. Since then the ear pain has gotten worse. On Wednesday he noticed left ear drainage.    In the ED at the [facility] he underwent a noncontrast CTH that showed opacification of the mastoid air cells and middle ear. A culture was taken of the drainage. He was started on vanc/zosyn by the primary team. He was started on insulin gtt for glucose of 600 and tested positive for COVID.      The patient says his ear pain is a bit better since coming in. He thinks his hearing is muffled. He has no prior history of ear infection and denies any surgery on his ear. He says that he has pain on his mastoid process. The rest of the ROS is unremarkable.    OBJECTIVE  /81 (BP Location: Right arm)   Pulse 85   Temp 99.1  F (37.3  C) (Oral)   Resp 16   Ht 1.676 m (5' 6\")   Wt 67.1 kg (148 lb)   SpO2 98%   BMI 23.89 kg/m    GENERAL - adult male sitting up in ED bed, no acute distress  FACE - HB1/6, CN V1-3 intact bl  NOSE - symmetrical externally, no masses on anterior rhinoscopy  EYE - EOMI, PERRL  EARS - right ear wnl. Left pinna mildly tender, there is tenderness of the mastoid process, EAC with some debris. This is suctioned away. The TM is edematous. I do not see a perforation. Patient is uncomfortable  ORAL CAV - dentition wnl, no oral lesions, tongue midline  OROPHARYNX - symmetrical palatal elevation, no tonsillar masses  NECK - soft, no LAD  PULM - breathing comfortably on RA  NEURO - awake, alert, pleasant    LABS  WBC 7  , Fibrinogen 764, COVID+    IMAGING  CTH w/o 1/2/2022 - reviewed independently, opacification of the left mastoid arir cells, middle ear.   The bony calvaria and " the bones of the skull base are normal.  Visualized paranasal sinuses are clear. The left mastoid air cells are  nearly completely opacified. Fluid and soft tissue thickening is seen  in the middle ear as well                          Impression:  Findings suggestive of acute otomastoiditis.    ASSESSMENT  Mr. Torrez is a 42 w/ acute otitis media and non-coalescent mastoiditis. He has some post-auricular tenderness but no evidence of an abscess on CT or exam. I was unable to visualize a perforation on otoscopy but it is reasonable to assume one exists given the mucopurulent drainage.    At this time IV and topical antibiotics would be the therapy of choice given that he already has a myringotomy. CT temporal bone w/ IV contrast would allow us to evaluate patency of sigmoid sinus.     PLAN  - recommend IV antibiotics w/ pseudomonal coverage  - ciprodex drops BID x14 days  - recommend CT temporal bone w/ IV contrast to evaluate sigmoid sinus  - ENT will continue to follow while in-house    Randall Grimm MD PGY3    Pt to be discussed w/ attdg surgeon Dr. Niall Church and chief resident Phong Castillo

## 2022-01-02 NOTE — H&P
[facility] History and Physical        Date of Admission:  1/1/2022  Date of Service: 1/1/2022         HPI      Chief Complaint   Ear pain    History is obtained from the patient    History of Present Illness   Mahsa is a 42 year old previously healthy male who presented with ear pain admitted for hyperglycemia in the setting of new diabetes diagnosis.    Patient reports he was in his normal state of health until Monday (6 days ago) when he developed left ear pain.  He reports traveling from [place] on Sunday and had some discomfort in his ears on Monday the pain got worse and has been worsening throughout the week.  Patient also notes some whitish discharge that began and so came to the ED for further evaluation. He reports that he does feel better and is in less pain than earlier this week. Patient denies history of ear infections or ear surgeries.  Reports that he did have some dizziness earlier today, but feels well currently.  Denies fevers, chills, sore throat, sinus pain, cough, shortness of breath, abdominal pain, nausea or vomiting.    Not Covid vaccinated.    ED Course:  Vitals: afebrole, HR to 104, BP 120s/80s, O2 97%  Labs: CBC normal. Glucose 604. Na 130 (142c), K 4.3, bicarb 33. A1c 13.7. COVID-19 positive.  Interventions: 1L NS, tylenol, augmentin PO, insulin gtt           History:   Review of Systems   The 10 point Review of Systems is negative other than noted in the HPI or here.     Past Medical History    Past medical history reviewed with no previously diagnosed medical problems.    Past Surgical History   Past surgical history review with no previous surgeries identified.    Social History   Social History     Tobacco Use     Smoking status: Current Some Day Smoker     Smokeless tobacco: Never Used   Substance Use Topics     Alcohol use: Not on file     Drug use: Never     Family History   Family history reviewed with  patient and is noncontributory.    Prior to Admission Medications   None     Allergies   No Known Allergies        Physical Exam      Vital Signs: Temp: 99.2  F (37.3  C) Temp src: Oral BP: 123/81 Pulse: 104   Resp: 16 SpO2: 97 % O2 Device: None (Room air)    Weight: 148 lbs 0 oz    Physical Exam  Vitals reviewed.   Constitutional:       General: He is not in acute distress.     Appearance: Normal appearance. He is not ill-appearing.   HENT:      Head: Normocephalic and atraumatic.      Right Ear: Tympanic membrane, ear canal and external ear normal.      Ears:      Comments: L ear canal with soft tissue swelling and white purulent drainage. Unable to visualize TM.     Nose: Nose normal. No congestion.      Mouth/Throat:      Mouth: Mucous membranes are moist.      Pharynx: Oropharynx is clear. No oropharyngeal exudate or posterior oropharyngeal erythema.   Eyes:      Conjunctiva/sclera: Conjunctivae normal.   Cardiovascular:      Rate and Rhythm: Normal rate and regular rhythm.      Heart sounds: No murmur heard.      Pulmonary:      Effort: Pulmonary effort is normal. No respiratory distress.      Breath sounds: Normal breath sounds.   Abdominal:      General: There is no distension.      Palpations: Abdomen is soft.      Tenderness: There is no abdominal tenderness. There is no guarding or rebound.   Musculoskeletal:         General: No deformity. Normal range of motion.   Skin:     General: Skin is warm and dry.   Neurological:      General: No focal deficit present.      Mental Status: He is alert.      Motor: No weakness.      Gait: Gait normal.   Psychiatric:         Behavior: Behavior normal.         Thought Content: Thought content normal.         Assessment & Plan      Mahsa is a 42 year old male admitted on 1/1/2022. He presents with ear pain consistent with otitis media and likely ruptured eardrum and is admitted for hyperglycemia in the setting of new diabetes diagnosis and found to be asymptomatic  COVID positive.    #Hyperglycemia  #DM2, new dx  Patient incidentally found to have blood glucose of 600 on admission, a1c 13.7. Bicarb 33. Not in DKA.  -NPO   -insulin gtt per protocol, plan to transition to subQ once within goal range  -hypoglycemia protocol  -diabetes education consult    #Otitis Media  Patient with sebastian purulent drainage of left ear with soft tissue swelling obscuring the tympanic membrane. Patients story is consistent with an otitis media that has had drainage after TM ruptured. Vitals otherwise within normal limits and blood work reassuring against systemic infection.   -augmentin 875-125mg BID  -tylenol PRN    #COVID-19 Infection  Incidental. Otherwise asymptomatic, not on oxygen.  Not Covid vaccinated but is interested in receiving vaccine.  -baseline COVID labs    # Pain Assessment:  Current Pain Score 1/1/2022   Patient currently in pain? denies   Yasin s pain level was assessed and he currently denies pain.      Diet: NPO for Medical/Clinical Reasons Except for: Meds, Ice Chips  Fluids: insulin gtt  DVT Prophylaxis: Low Risk/Ambulatory with no VTE prophylaxis indicated  Code Status: Full Code    Disposition Plan   Expected discharge: 1-2 days; recommended to prior living arrangement once blood glucose stable, home diabetes regimen established. Dispo:         Entered: Lewis Ovalle 01/01/2022, 10:02 PM   Information in the above section will display in the discharge planner report.    Discussed with faculty but not examined by faculty.    [facility]  [facility]   Pager: [phone]  Please see sticky note for cross cover information      Results:     Data   Recent Labs   Lab 01/01/22  1753 01/01/22  1544   WBC  --  6.9   HGB  --  14.6   MCV  --  89   PLT  --  291   NA  --  130*   POTASSIUM  --  4.3   CHLORIDE  --  91*   CO2  --  33*   BUN  --  12   CR  --  0.55*   ANIONGAP  --  6   SEBASTIÁN  --  9.8   * 604*    ALBUMIN  --  2.6*   PROTTOTAL  --  7.4   BILITOTAL  --  0.2   ALKPHOS  --  95   ALT  --  19   AST  --  10     No results found for this or any previous visit (from the past 24 hour(s)).

## 2022-01-02 NOTE — PHARMACY-VANCOMYCIN DOSING SERVICE
"Pharmacy Vancomycin Initial Note  Date of Service 2022  Patient's  1980  42 year old, male    Indication: Mastoiditis    Current estimated CrCl = Estimated Creatinine Clearance: 192.3 mL/min (A) (based on SCr of 0.47 mg/dL (L)).    Creatinine for last 3 days  2022:  3:44 PM Creatinine 0.55 mg/dL  2022:  6:24 AM Creatinine 0.47 mg/dL    Recent Vancomycin Level(s) for last 3 days  No results found for requested labs within last 72 hours.      Vancomycin IV Administrations (past 72 hours)      No vancomycin orders with administrations in past 72 hours.                Nephrotoxins and other renal medications (From now, onward)    Start     Dose/Rate Route Frequency Ordered Stop    22 1230  vancomycin (VANCOCIN) 1,750 mg in sodium chloride 0.9 % 500 mL intermittent infusion         1,750 mg  over 120 Minutes Intravenous ONCE 22 1148      22 1200  piperacillin-tazobactam (ZOSYN) 4.5 g vial to attach to  mL bag        Note to Pharmacy: For [facility], [facility] and [facility]: For Zosyn-naive patients, use the \"Zosyn initial dose + extended infusion\" order panel.    4.5 g  over 30 Minutes Intravenous EVERY 6 HOURS 22 1125      22 0030  vancomycin 1250 mg in 0.9% NaCl 250 mL intermittent infusion 1,250 mg         1,250 mg  over 90 Minutes Intravenous EVERY 12 HOURS 22 1149            Contrast Orders - past 72 hours (72h ago, onward)            None        [employer] Prediction of Planned Initial Vancomycin Regimen  Loading dose: 1750 mg at 12:30 2022.  Regimen: 1250 mg IV every 12 hours.  Start time: 11:51 on 2022  Exposure target: AUC24 (range)400-600 mg/L.hr   AUC24,ss: 463 mg/L.hr  Probability of AUC24 > 400: 65 %  Ctrough,ss: 12.6 mg/L  Probability of Ctrough,ss > 20: 20 %  Probability of nephrotoxicity (Lodise BETZY ): 8 %      Plan:  1. Start vancomycin  1750 mg IV x1, then 1250mg mg IV q12h.   2. Vancomycin monitoring method: AUC  3. Vancomycin therapeutic " monitoring goal: 400-600 mg*h/L  4. Pharmacy will check vancomycin levels as appropriate in 1-3 Days.    5. Serum creatinine levels will be ordered daily for the first week of therapy and at least twice weekly for subsequent weeks.      Thank you,  Andy J. Kurtzweil, PharmD

## 2022-01-03 LAB
ANION GAP SERPL CALCULATED.3IONS-SCNC: 5 MMOL/L (ref 3–14)
BUN SERPL-MCNC: 13 MG/DL (ref 7–30)
C PEPTIDE SERPL-MCNC: 0.6 NG/ML (ref 0.9–6.9)
CALCIUM SERPL-MCNC: 9.4 MG/DL (ref 8.5–10.1)
CHLORIDE BLD-SCNC: 104 MMOL/L (ref 94–109)
CO2 SERPL-SCNC: 29 MMOL/L (ref 20–32)
CREAT SERPL-MCNC: 0.6 MG/DL (ref 0.66–1.25)
ERYTHROCYTE [DISTWIDTH] IN BLOOD BY AUTOMATED COUNT: 12 % (ref 10–15)
GFR SERPL CREATININE-BSD FRML MDRD: >90 ML/MIN/1.73M2
GLUCOSE BLD-MCNC: 212 MG/DL (ref 70–99)
GLUCOSE BLDC GLUCOMTR-MCNC: 158 MG/DL (ref 70–99)
GLUCOSE BLDC GLUCOMTR-MCNC: 197 MG/DL (ref 70–99)
GLUCOSE BLDC GLUCOMTR-MCNC: 200 MG/DL (ref 70–99)
GLUCOSE BLDC GLUCOMTR-MCNC: 313 MG/DL (ref 70–99)
GLUCOSE BLDC GLUCOMTR-MCNC: 332 MG/DL (ref 70–99)
HCT VFR BLD AUTO: 43 % (ref 40–53)
HGB BLD-MCNC: 14.3 G/DL (ref 13.3–17.7)
MCH RBC QN AUTO: 29.9 PG (ref 26.5–33)
MCHC RBC AUTO-ENTMCNC: 33.3 G/DL (ref 31.5–36.5)
MCV RBC AUTO: 90 FL (ref 78–100)
MRSA DNA SPEC QL NAA+PROBE: NEGATIVE
PANC ISLET CELL AB TITR SER: NORMAL {TITER}
PLATELET # BLD AUTO: 335 10E3/UL (ref 150–450)
POTASSIUM BLD-SCNC: 4.3 MMOL/L (ref 3.4–5.3)
RBC # BLD AUTO: 4.78 10E6/UL (ref 4.4–5.9)
SA TARGET DNA: NEGATIVE
SODIUM SERPL-SCNC: 138 MMOL/L (ref 133–144)
WBC # BLD AUTO: 6.8 10E3/UL (ref 4–11)

## 2022-01-03 PROCEDURE — 87641 MR-STAPH DNA AMP PROBE: CPT | Performed by: FAMILY MEDICINE

## 2022-01-03 PROCEDURE — 250N000011 HC RX IP 250 OP 636: Performed by: FAMILY MEDICINE

## 2022-01-03 PROCEDURE — 250N000011 HC RX IP 250 OP 636: Performed by: STUDENT IN AN ORGANIZED HEALTH CARE EDUCATION/TRAINING PROGRAM

## 2022-01-03 PROCEDURE — 250N000009 HC RX 250: Performed by: STUDENT IN AN ORGANIZED HEALTH CARE EDUCATION/TRAINING PROGRAM

## 2022-01-03 PROCEDURE — 36415 COLL VENOUS BLD VENIPUNCTURE: CPT | Performed by: STUDENT IN AN ORGANIZED HEALTH CARE EDUCATION/TRAINING PROGRAM

## 2022-01-03 PROCEDURE — 85014 HEMATOCRIT: CPT | Performed by: STUDENT IN AN ORGANIZED HEALTH CARE EDUCATION/TRAINING PROGRAM

## 2022-01-03 PROCEDURE — 250N000013 HC RX MED GY IP 250 OP 250 PS 637: Performed by: STUDENT IN AN ORGANIZED HEALTH CARE EDUCATION/TRAINING PROGRAM

## 2022-01-03 PROCEDURE — 99233 SBSQ HOSP IP/OBS HIGH 50: CPT | Mod: GC | Performed by: FAMILY MEDICINE

## 2022-01-03 PROCEDURE — 120N000002 HC R&B MED SURG/OB UMMC

## 2022-01-03 PROCEDURE — 258N000003 HC RX IP 258 OP 636: Performed by: STUDENT IN AN ORGANIZED HEALTH CARE EDUCATION/TRAINING PROGRAM

## 2022-01-03 PROCEDURE — 80048 BASIC METABOLIC PNL TOTAL CA: CPT | Performed by: STUDENT IN AN ORGANIZED HEALTH CARE EDUCATION/TRAINING PROGRAM

## 2022-01-03 RX ADMIN — GABAPENTIN 300 MG: 300 CAPSULE ORAL at 08:38

## 2022-01-03 RX ADMIN — INSULIN ASPART 3 UNITS: 100 INJECTION, SOLUTION INTRAVENOUS; SUBCUTANEOUS at 22:56

## 2022-01-03 RX ADMIN — SODIUM CHLORIDE 50 ML: 9 INJECTION, SOLUTION INTRAVENOUS at 12:14

## 2022-01-03 RX ADMIN — ENOXAPARIN SODIUM 40 MG: 40 INJECTION SUBCUTANEOUS at 10:36

## 2022-01-03 RX ADMIN — GABAPENTIN 300 MG: 300 CAPSULE ORAL at 14:40

## 2022-01-03 RX ADMIN — VANCOMYCIN HYDROCHLORIDE 1250 MG: 100 INJECTION, POWDER, LYOPHILIZED, FOR SOLUTION INTRAVENOUS at 14:37

## 2022-01-03 RX ADMIN — CIPROFLOXACIN AND DEXAMETHASONE 4 DROP: 3; 1 SUSPENSION/ DROPS AURICULAR (OTIC) at 20:54

## 2022-01-03 RX ADMIN — PIPERACILLIN SODIUM AND TAZOBACTAM SODIUM 4.5 G: 4; 500 INJECTION, POWDER, FOR SOLUTION INTRAVENOUS at 17:42

## 2022-01-03 RX ADMIN — ACETAMINOPHEN 650 MG: 325 TABLET, FILM COATED ORAL at 02:03

## 2022-01-03 RX ADMIN — CIPROFLOXACIN AND DEXAMETHASONE 4 DROP: 3; 1 SUSPENSION/ DROPS AURICULAR (OTIC) at 08:38

## 2022-01-03 RX ADMIN — PIPERACILLIN SODIUM AND TAZOBACTAM SODIUM 4.5 G: 4; 500 INJECTION, POWDER, FOR SOLUTION INTRAVENOUS at 12:20

## 2022-01-03 RX ADMIN — REMDESIVIR 100 MG: 100 INJECTION, POWDER, LYOPHILIZED, FOR SOLUTION INTRAVENOUS at 10:36

## 2022-01-03 RX ADMIN — PIPERACILLIN SODIUM AND TAZOBACTAM SODIUM 4.5 G: 4; 500 INJECTION, POWDER, FOR SOLUTION INTRAVENOUS at 00:29

## 2022-01-03 RX ADMIN — PIPERACILLIN SODIUM AND TAZOBACTAM SODIUM 4.5 G: 4; 500 INJECTION, POWDER, FOR SOLUTION INTRAVENOUS at 06:03

## 2022-01-03 RX ADMIN — GABAPENTIN 300 MG: 300 CAPSULE ORAL at 20:54

## 2022-01-03 RX ADMIN — VANCOMYCIN HYDROCHLORIDE 1250 MG: 100 INJECTION, POWDER, LYOPHILIZED, FOR SOLUTION INTRAVENOUS at 01:52

## 2022-01-03 ASSESSMENT — ACTIVITIES OF DAILY LIVING (ADL)
ADLS_ACUITY_SCORE: 3
ADLS_ACUITY_SCORE: 3
DIFFICULTY_COMMUNICATING: NO
ADLS_ACUITY_SCORE: 3
DEPENDENT_IADLS:: INDEPENDENT
HEARING_DIFFICULTY_OR_DEAF: NO
ADLS_ACUITY_SCORE: 3
PATIENT_/_FAMILY_COMMUNICATION_STYLE: SPOKEN LANGUAGE (ENGLISH OR BILINGUAL)
ADLS_ACUITY_SCORE: 3
ADLS_ACUITY_SCORE: 3
FALL_HISTORY_WITHIN_LAST_SIX_MONTHS: NO
CONCENTRATING,_REMEMBERING_OR_MAKING_DECISIONS_DIFFICULTY: NO
ADLS_ACUITY_SCORE: 3
DIFFICULTY_EATING/SWALLOWING: NO
ADLS_ACUITY_SCORE: 7
ADLS_ACUITY_SCORE: 3
DOING_ERRANDS_INDEPENDENTLY_DIFFICULTY: NO
ADLS_ACUITY_SCORE: 3
TOILETING_ISSUES: NO
WALKING_OR_CLIMBING_STAIRS_DIFFICULTY: NO
ADLS_ACUITY_SCORE: 3
DRESSING/BATHING_DIFFICULTY: NO
ADLS_ACUITY_SCORE: 3
ADLS_ACUITY_SCORE: 7
WEAR_GLASSES_OR_BLIND: NO

## 2022-01-03 NOTE — UTILIZATION REVIEW
Admission Status; Secondary Review Determination       Under the authority of the Utilization Management Committee, the utilization review process indicated a secondary review on the above patient. The review outcome is based on review of the medical records, discussions with staff, and applying clinical experience noted on the date of the review.     (x) Inpatient Status Appropriate - This patient's medical care is consistent with medical management for inpatient care and reasonable inpatient medical practice.     RATIONALE FOR DETERMINATION     Mahsa Torrez is a 42 year old male who, at the time of presentation, had no  known chronic medical problems.  He was admitted to [facility] on 1/1/2022 with new onset diabetes, severe hyperglycemia, and otitis media with tympanic membrane perforation.  He was also noted to be Covid positive, which appears to be incidental finding.  He was treated with IV fluids and insulin drip for new diabetes and hyperglycemia.  Blood sugar improved.  He was treated with Augmentin for otitis media with tympanic membrane perforation.  He has not received specific treatment for asymptomatic COVID-19.  After admission he was noted to have significant tenderness of left mastoid and earlobe.  CT of head was obtained and suggested otomastoiditis.  Augmentin was changed to IV vancomycin and IV Zosyn.  ENT has been consulted and was following.  Ciprodex eardrops were added.  Inpatient admission is appropriate for this patient with multiple new medical issues including new diabetes mellitus, otomastoiditis, and incidental finding of COVID-19.  He requires IV antibiotics and continued monitoring to ensure improvement of otomastoiditis.       At the time of admission with the information available to the attending physician more than 2 nights Hospital complex care was anticipated, based on patient risk of adverse outcome if treated as outpatient and complex care required. Inpatient admission is appropriate  based on the Medicare guidelines.     This document was produced using voice recognition software       The information on this document is developed by the utilization review team in order for the business office to ensure compliance. This only denotes the appropriateness of proper admission status and does not reflect the quality of care rendered.   The definitions of Inpatient Status and Observation Status used in making the determination above are those provided in the CMS Coverage Manual, Chapter 1 and Chapter 6, section 70.4.   Sincerely,     Lincoln Savage MD    Utilization Review  Physician Advisor  [facility].

## 2022-01-03 NOTE — PROGRESS NOTES
[facility] Progress Note    Main Plans for Today     - confirmation labs for DMI vs DMII  - DM education  - increase Lantus to 15U at bedtime  - add on short acting 5U TID with meals  - social work consult  - IV Remdesevir day 2  - DVT prophylaxis Lovenox 30mg q24h  - ciprodex drops BID x14 days    Assessment & Plan   Mahsa Torrez is a 42 year old male with no medical history admitted on 1/1 for TM perforation with discharge, hyperglycemia in the setting of new diabetes diagnosis, and found to be asymptomatic COVID positive.     #Otomastoiditis  5 days of left ear pain following flight home from [place] followed by sebastian purulent drainage of left ear with soft tissue swelling obscuring the tympanic membrane. On exam this morning, significant tenderness of mastoid, earlobe but not cartilage. CT head c/w acute otomastoiditis. As patient will need 7-10d of IV abx, plan for PICC placement prior to discharge.   - Vancomycin/Zosyn for 7-10d  - ENT consulted, appreciate recommendations  - Ciprodex drops BID x 14 days  - Tylenol 650mg q6h prn    Microbiology:  - 1/2/22 ear drainage cx pending    Antibiotics:  - Augmentin 1/1 - 1/2  - Vancomycin 1/2 -   - Zosyn 1/2 -   - ciprodex drops BID 1/2 -    # Hyperglycemia  # diabetes mellitus, new dx  # neuropathy  Patient found to have asymptomatic blood glucose of 604 on admission, A1c 13.7, not in DKA. Reports history of bilateral tingling in his feet and lower legs along with recent unintentional weight loss. Started on insulin gtt per protocol and transitioned to subQ. No prior medical history so unclear if this is DMII or Latent Autoimmune Diabetes in Adults. ASCVD 4.3%.Will monitor insulin needs during hospital course and discharge home on appropriate regimen.   - Lantus to 15U at bedtime  - add on Novalog 5U TID with meals  - moderate carb diet  - MIIS  - hypoglycemia protocol  - diabetes education  consult  - Gabapentin 300 TID  - C peptide, glutamic acid transaminase pending  - moderate intensity statin at the time of discharge  - follow up at [facility] for virtual visit 7 days after discharge for establishing primary care and chronic disease management    # uninsured  Works at [employer] but has no health insurance. Given new diagnosis of diabetes, he will need health insurance to cover medication as well as establish with primary care for chronic disease management.  - social work consult  - re-consult pharmacy once insurance added  - follow up with [facility] for virtual visit 7 days after discharge     #COVID-19 Infection  # unvaccinated for COVID19  Incidental finding. CRP elevated at 210, d dimer normal.  Otherwise remains asymptomatic, 98% on room air.   - IV Remdesevir 3d per 12/30 CDC guidelines  - DVT prophylaxis Lovenox 30mg q24h  - qualifies for vaccination 10 days following positive PCR    Hyponatremia, resolved  Na 130 on admission. Resolved with PO intake    # Pain Assessment:  Current Pain Score 1/3/2022   Patient currently in pain? denies   - Mahsa is experiencing pain due to acute infection. Pain management was discussed and the plan was created in a collaborative fashion.  Mahsa's response to the current recommendations: engaged  - Please see the plan for pain management as documented above    Diet: Moderate Consistent Carb (60 g CHO per Meal) Diet  Fluids: PO  DVT Prophylaxis: Enoxaparin (Lovenox) SQ  Code Status: Full Code    Disposition Plan   Expected discharge:  2 - 3 days, recommended to prior living arrangement once antibiotic plan established, safe disposition plan/ TCU bed available and Insulin regimen for new DM diagnosis established        Entered: Dasha Hall 01/03/2022, 11:28 AM   Information in the above section will display in the discharge planner report.      The patient's care was discussed with the Attending Physician, Dr. Sriram Farrar.    Dasha Hall  [facility]  [facility]  Pager: [phone]  Please see sticky note for cross cover information    Interval History     Reports improvement of L mastoid pain. Still endorsing L ear drainage, inner ear pain.       Physical Exam   Vital Signs: Temp: 98.3  F (36.8  C) Temp src: Oral BP: 125/77 Pulse: 79   Resp: 16 SpO2: 98 % O2 Device: None (Room air)    Weight: 148 lbs 0 oz     Physical Exam  Vitals reviewed.   Constitutional:       Appearance: Normal appearance.   HENT:      Head: Normocephalic.      Right Ear: Tympanic membrane, ear canal and external ear normal.      Left Ear: Drainage (clear/white) and tenderness (mild over inferior mastoid, improved from 1/2) present.   Eyes:      Conjunctiva/sclera: Conjunctivae normal.      Pupils: Pupils are equal, round, and reactive to light.   Cardiovascular:      Rate and Rhythm: Normal rate and regular rhythm.      Pulses: Normal pulses.      Heart sounds: Normal heart sounds.   Pulmonary:      Effort: Pulmonary effort is normal. No accessory muscle usage or respiratory distress.   Abdominal:      General: Abdomen is flat.      Palpations: Abdomen is soft. There is no mass.      Tenderness: There is no abdominal tenderness.   Musculoskeletal:         General: Normal range of motion.      Cervical back: Normal range of motion.   Skin:     General: Skin is warm and dry.   Neurological:      Mental Status: He is alert and oriented to person, place, and time.   Psychiatric:         Mood and Affect: Mood normal.         Behavior: Behavior normal.         Data   Recent Labs   Lab 01/03/22  0823 01/03/22  0709 01/03/22  0151 01/02/22  0650 01/02/22  0624 01/01/22  2326 01/01/22  2235 01/01/22  1753 01/01/22  1544   WBC  --  6.8  --   --  7.5  --   --   --  6.9   HGB  --  14.3  --   --  14.0  --   --   --  14.6   MCV  --  90  --   --  89  --   --   --  89   PLT  --  335  --   --  290  --   --   --  291   INR  --   --   --   --   --   --  1.08  --   --    NA  --  138  --   --   136  --   --   --  130*   POTASSIUM  --  4.3  --   --  3.9  --   --   --  4.3   CHLORIDE  --  104  --   --  100  --   --   --  91*   CO2  --  29  --   --  27  --   --   --  33*   BUN  --  13  --   --  8  --   --   --  12   CR  --  0.60*  --   --  0.47*  --   --   --  0.55*   ANIONGAP  --  5  --   --  9  --   --   --  6   SEBASTIÁN  --  9.4  --   --  9.2  --   --   --  9.8   * 212* 197*   < > 203*   < >  --    < > 604*   ALBUMIN  --   --   --   --   --   --   --   --  2.6*   PROTTOTAL  --   --   --   --   --   --   --   --  7.4   BILITOTAL  --   --   --   --   --   --   --   --  0.2   ALKPHOS  --   --   --   --   --   --   --   --  95   ALT  --   --   --   --   --   --   --   --  19   AST  --   --   --   --   --   --   --   --  10    < > = values in this interval not displayed.     Recent Results (from the past 24 hour(s))   CT Temporal Bones w Contrast    Narrative    CT TEMPORAL W CONTRAST 1/2/2022 3:37 PM    Provided History: Mastoiditis     Comparison: Same day CT head.     Technique: Using multidetector thin collimation helical acquisition  technique, axial and coronal thin section CT images were reconstructed  through both temporal bones. Additional reconstructions performed in  the planes of Poschl and Stenver were also obtained. Images were  reviewed in a bone window.    Findings:   Right temporal bone: The mastoid air cells are clear. There is no  debris in the external auditory canal. The tympanic membrane is  intact. There is no fluid or soft tissue thickening in the right  middle ear cavity. The bony ossicles are intact and in normal  alignment. The epitympanum is clear. The bony scutum is sharp. The  internal auditory canal and the labyrinthine structures are within  normal limits. The facial nerve canal appears normal along its course.    Left temporal bone: The mastoid air cells are nearly completely  opacified. There is moderate mucosal debris/thickening in the external  auditory canal. The tympanic  membrane is not well seen due to moderate  amount of soft tissue thickening/fluid in the middle ear cavity. The  bony ossicles are intact and in normal alignment. The epitympanum is  clear. The bony scutum is sharp. The internal auditory canal and the  labyrinthine structures are within normal limits. The facial nerve  canal appears normal along its course.     Thinned appearance of the sigmoid plate (series 4, image 70) without  any definite evidence of dehiscence. Suboptimal contrast timing limits  evaluation of the left sigmoid sinus, but no definite filling defect  or thrombus is identified. The right sigmoid sinuses are small and  difficult to visualize.      Impression    Impression:    1. Findings compatible with acute left-sided otomastoiditis. There is  thinned appearance of the sigmoid plate but no definite evidence of  dehiscence.  2. Suboptimal contrast timing limits evaluation of the left sigmoid  sinus, but no definite filling defect or thrombus is identified. If  there is continued clinical concern, consider head MRV.    I have personally reviewed the examination and initial interpretation  and I agree with the findings.    CHAD PAREKH MD         SYSTEM ID:  [id number]

## 2022-01-03 NOTE — CONSULTS
Discharge Pharmacy Test Claim    Patient does not have insurance on file. Pharmacy liaison is unable to complete a test claim for diabetic medications or supplies. Please re-consult pharmacy liaison if insurance has been added.      Dasha Garcia  [facility] Pharmacy Liaison  Ph: [phone] Pager: [phone]

## 2022-01-03 NOTE — DISCHARGE SUMMARY
[facility]    Family Medicine Discharge Summary- [facility]  Service    Date of Admission:  1/1/2022  Date of Service: 1/6/2022  Date of Discharge:  1/6/2022  Discharging Attending Provider: Aleja Parker MD  Discharge Team: Alexa    Discharge Diagnoses   PCP:    - establish care   - hospital follow up   - new diagnosis of Type 2 DM; continue treatment regimen and management of chronic illness   - COVID19 immunization (qualifies after 1/11/22)    Follow-ups Needed After Discharge   Follow up with primary care provider, [facility] ([facility]), on 1/18 at 11am to establish care, hospital follow up with new diabetes diagnosis. Will need further titration of insulin and initiation of statin.     Follow up with [facility] ENT on 1/13 at 2:25 pm for mastoiditis. Continue Levoquin and Ciprodex drops through 1/16/22    Hospital Course   Mahsa Torrez is a 42 year old male with no medical history admitted on 1/1/2022 for hyperglycemia and mastoiditis.  The following problems were addressed during his hospitalization:    #Otomastoiditis  5 days of left ear pain following flight home from [place] followed by sebastian purulent drainage of left ear with soft tissue swelling obscuring the tympanic membrane. Started on Augmentin for otitis media but CT head c/w acute otomastoiditis. ENT was consulted and deferred any surgical intervention. Patient was started on Vanc/Zosyn 1/2, transitioned to cefepime TID on 1/4 after a PICC was placed. He was discharged on Levoquin 750 mg with last day planned for 1/16/22.  He also received Ciprodex drops BID with plan for continuation through 1/16/2022.    # Hyperglycemia, improving  # Diabetes Mellitus Type 2, new dx  # neuropathy, chronic  Patient with no prior medical care found to have asymptomatic BG of 604 on admission, A1c 13.7, not in DKA. Reports history of recent unintentional weight loss and bilateral  neuropathy. He was started on insulin gtt per protocol and transitioned to subQ. ASCVD 4.3% with no kidney disease (urine albumin-to-creatinine ratio < 30%). C-peptide level indicates some production, but below the normal limit, however Islet Cell antibody negative, therefore treating as DMII. He received Diabetes Education during his stay to learn how to perform self-injections and manage this new chronic illness. He was connected with [facility] clinic for primary care and was scheduled for a follow up visit to establish care and for chronic disease management on 1/18. The social work and pharmacy team also worked with him to establish insurance and estimate cost of monthly medications.    At the time of discharge, diabetes regimen was:  Insulin Novalog 8 units TID with meals  Insulin Lantus 25 units at consistent time every night (works two jobs so does not have regular bedtime)      #COVID-19 Infection  # unvaccinated for COVID19  Incidental finding. CRP elevated at 210, d dimer normal.  Remained asymptomatic, 98% on room air. Per 12/30/2021 CDC guidelines, was treated with 3 days of IV Remdesevir and was discharged to complete 10d isolation. Was interested in vaccination so recommended discussing this with PCP at hospital follow up.     Pseudohyponatremia, resolved  Na 130 on admission, however glucose level >600. When corrected for hyperglycemia Na of 138. Improved with PO intake and BG correction.    # Discharge Pain Plan:   - During his hospitalization, Mahsa experienced pain due to diabetic neuropathy.  The pain plan for discharge was discussed with Mahsa and the plan was created in a collaborative fashion.    - Pharmacologic adjuvants:  Gabapentin (Neurontin)    Consultations This Hospital Stay   PHARMACY IP CONSULT  ENDOCRINE DIABETES ADULT IP CONSULT  DIABETES EDUCATION IP CONSULT  DIABETES EDUCATION IP CONSULT  SOCIAL WORK IP CONSULT  PHARMACY TO DOSE VANCO  ENT IP CONSULT  PHARMACY LIAISON FOR MEDICATION  "COVERAGE CONSULT  VASCULAR ACCESS FOR PICC PLACEMENT ADULT IP CONSULT  PHARMACY LIAISON FOR MEDICATION COVERAGE CONSULT    Code Status   Full Code       The patient was discussed with Dr. Aleja Mai, PGY-2  [facility]  [facility]   Pager:[phone]_  ___________________________________________________________________    Physical Exam   Vital Signs:   /71 (BP Location: Left arm)   Pulse 76   Temp 98.4  F (36.9  C) (Oral)   Resp 16   Ht 1.676 m (5' 6\")   Wt 67.1 kg (148 lb)   SpO2 97%   BMI 23.89 kg/m      Weight: 148 lbs 0 oz    General Appearance: Alert, oriented, good spirits  Respiratory: Breathing comfortably on room air, lungs CTAB  Cardiovascular: RRR  GI: soft, nontender  Skin: warm and dry, no obvious lesions  Other: left mastoid process non-tender, without swelling or erythema, and left ear without active drainage    Significant Results and Procedures   Results for orders placed or performed during the hospital encounter of 01/01/22   CT Head w/o Contrast    Narrative    CT HEAD W/O CONTRAST 1/2/2022 10:22 AM    History: Ear pain, Rule out mastoiditis.     Comparison: None.    Technique: Using multidetector thin collimation helical acquisition  technique, axial, coronal and sagittal CT images from the skull base  to the vertex were obtained without intravenous contrast.   (topogram) image(s) also obtained and reviewed.    Findings: There is no intracranial hemorrhage, mass effect, or midline  shift. Gray/white matter differentiation in both cerebral hemispheres  is preserved. Ventricles are proportionate to the cerebral sulci. The  basal cisterns are clear.    The bony calvaria and the bones of the skull base are normal.  Visualized paranasal sinuses are clear. The left mastoid air cells are  nearly completely opacified. Fluid and soft tissue thickening is seen  in the middle ear as well.      Impression    Impression: " Left middle ear and mastoid fluid, potentially  otomastoiditis. Otherwise, no acute intracranial pathology.    I have personally reviewed the examination and initial interpretation  and I agree with the findings.    LENKA OQUENDO MD         SYSTEM ID:  [id number]   CT Temporal Bones w Contrast    Narrative    CT TEMPORAL W CONTRAST 1/2/2022 3:37 PM    Provided History: Mastoiditis     Comparison: Same day CT head.     Technique: Using multidetector thin collimation helical acquisition  technique, axial and coronal thin section CT images were reconstructed  through both temporal bones. Additional reconstructions performed in  the planes of Poschl and Stenver were also obtained. Images were  reviewed in a bone window.    Findings:   Right temporal bone: The mastoid air cells are clear. There is no  debris in the external auditory canal. The tympanic membrane is  intact. There is no fluid or soft tissue thickening in the right  middle ear cavity. The bony ossicles are intact and in normal  alignment. The epitympanum is clear. The bony scutum is sharp. The  internal auditory canal and the labyrinthine structures are within  normal limits. The facial nerve canal appears normal along its course.    Left temporal bone: The mastoid air cells are nearly completely  opacified. There is moderate mucosal debris/thickening in the external  auditory canal. The tympanic membrane is not well seen due to moderate  amount of soft tissue thickening/fluid in the middle ear cavity. The  bony ossicles are intact and in normal alignment. The epitympanum is  clear. The bony scutum is sharp. The internal auditory canal and the  labyrinthine structures are within normal limits. The facial nerve  canal appears normal along its course.     Thinned appearance of the sigmoid plate (series 4, image 70) without  any definite evidence of dehiscence. Suboptimal contrast timing limits  evaluation of the left sigmoid sinus, but no definite filling  defect  or thrombus is identified. The right sigmoid sinuses are small and  difficult to visualize.      Impression    Impression:    1. Findings compatible with acute left-sided otomastoiditis. There is  thinned appearance of the sigmoid plate but no definite evidence of  dehiscence.  2. Suboptimal contrast timing limits evaluation of the left sigmoid  sinus, but no definite filling defect or thrombus is identified. If  there is continued clinical concern, consider head MRV.    I have personally reviewed the examination and initial interpretation  and I agree with the findings.    CHAD PAREKH MD         SYSTEM ID:  [id number]       Pending Results   These results will be followed up by PCP  Unresulted Labs Ordered in the Past 30 Days of this Admission     Date and Time Order Name Status Description    1/2/2022  9:34 AM Anaerobic Bacterial Culture Routine Preliminary              Primary Care Physician   Physician No Ref-Primary    Discharge Disposition   Discharged to home  Condition at discharge: Fair    Discharge Orders      ALCOHOL WIPES PER BOX     Medication Therapy Management Referral      Otolaryngology Referral      Reason for your hospital stay    You were hospitalized for acute mastoiditis and hypoglycemia (high blood sugar) due to diabetes, and found to be positive for COVID 19.     Activity    Your activity upon discharge: activity as tolerated     Adult [facility] Follow-up and recommended labs and tests    Follow up with primary care provider, [facility]), on 1/18 at 11am to evaluate medication change, for hospital follow- up, and regarding new diabetes diagnosis.  Insulin adjustments may be needed.     Follow up with [facility] on 1/13 at 2:25 pm for mastoiditis check     Appointments on [facility] and/or [facility] (with [facility] or [facility] provider or service). Call [phone] if you haven't heard regarding these appointments within 7 days of discharge.      Full Code     Diet    Follow this diet upon discharge: Orders Placed This Encounter      Moderate Consistent Carb (60 g CHO per Meal) Diet     Discharge Medications   Discharge Medication List as of 1/6/2022  3:31 PM      START taking these medications    Details   blood glucose (NO BRAND SPECIFIED) lancets standard Use to test blood sugar 4 times daily or as directed.Disp-100 lancet, [id number]      blood glucose (NO BRAND SPECIFIED) test strip Use to test blood sugar 4 times daily or as directed., Disp-100 strip, R-0, E-Prescribe      blood glucose monitoring (NO BRAND SPECIFIED) meter device kit Use to test blood sugar 4 times daily or as directed.Disp-1 kit, [id number]      ciprofloxacin-dexamethasone (CIPRODEX) 0.3-0.1 % otic suspension Place 4 drops Into the left ear 2 times daily Use for 10 days, Disp-7.5 mL, R-0, E-Prescribe      gabapentin (NEURONTIN) 300 MG capsule Take 1 capsule (300 mg) by mouth 3 times daily, Disp-90 capsule, R-0, E-Prescribe      insulin aspart (NOVOLOG FLEXPEN) 100 UNIT/ML pen Novolog Flexpen: 8 units with breakfast, 8 units with lunch, 8 units with dinner., Disp-15 mL, R-0, E-Prescribe      insulin glargine (LANTUS PEN) 100 UNIT/ML pen Inject 25 Units Subcutaneous At Bedtime, Disp-15 mL, R-0, E-PrescribeIf Lantus is not covered by insurance, may substitute Basaglar at same dose and frequency.        insulin pen needle (32G X 4 MM) 32G X 4 MM miscellaneous Use 4 pen needles daily or as directed.Disp-100 each, [id number]      levofloxacin (LEVAQUIN) 750 MG tablet Take 1 tablet (750 mg) by mouth daily for 10 days, Disp-10 tablet, R-0, E-Prescribe      Sharps Container MISC 1 each daily, Disp-1 each, R-0, E-Prescribe           Allergies   No Known Allergies

## 2022-01-03 NOTE — PROGRESS NOTES
"Otolaryngology Progress Note  January 3, 2022    S: No acute events overnight.     O: /77 (BP Location: Left arm)   Pulse 79   Temp 98.3  F (36.8  C) (Oral)   Resp 16   Ht 1.676 m (5' 6\")   Wt 67.1 kg (148 lb)   SpO2 98%   BMI 23.89 kg/m    General: laying in bed, no acute distress  HEAD: normocephalic, atraumatic  Face: symmetrical, CN VII intact bilaterally (HB 1), no swelling, edema, or erythema. Sensation V1-V3 intact and equal bilaterally.   Eyes: EOMI without spontaneous or gaze evoked nystagmus, PERRL, clear sclera  Ears: R ear WNL. L ear with mild tenderness to pinna and mastoid process. External ear canal open with minimal debris. Mucopurulent drainage observed at TM obstructing view.   Nose: no anterior drainage   Mouth: moist, no ulcers, no jaw or tooth tenderness, tongue midline and symmetric  Oropharynx: tonsils within normal limits, uvula midline, no oropharyngeal erythema  Neck: no LAD, trachea midline  Neuro: cranial nerves 2-12 grossly intact  Respiratory: breathing non-labored on RA, no stridor  Psych: pleasant affect  Cardio: extremities warm and well perfused     No intake or output data in the 24 hours ending 01/03/22 0947    LABS:  ROUTINE IP LABS (Last four results)  BMPRecent Labs   Lab 01/03/22  0823 01/03/22  0709 01/03/22  0151 01/02/22  1836 01/02/22  0650 01/02/22  0624 01/01/22  1753 01/01/22  1544   NA  --  138  --   --   --  136  --  130*   POTASSIUM  --  4.3  --   --   --  3.9  --  4.3   CHLORIDE  --  104  --   --   --  100  --  91*   SEBASTIÁN  --  9.4  --   --   --  9.2  --  9.8   CO2  --  29  --   --   --  27  --  33*   BUN  --  13  --   --   --  8  --  12   CR  --  0.60*  --   --   --  0.47*  --  0.55*   * 212* 197* 386*   < > 203*   < > 604*    < > = values in this interval not displayed.     CBC  Recent Labs   Lab 01/03/22  0709 01/02/22  0624 01/01/22  1544   WBC 6.8 7.5 6.9   RBC 4.78 4.69 4.88   HGB 14.3 14.0 14.6   HCT 43.0 41.9 43.2   MCV 90 89 89   MCH 29.9 " 29.9 29.9   MCHC 33.3 33.4 33.8   RDW 12.0 12.0 11.9    290 291     INR  Recent Labs   Lab 01/01/22  2235   INR 1.08       A/P: Mahsa Torrez is a 42 year old male with acute otitis media and non-coalescent mastoiditis in the setting of newly diagnosed diabetes. CTH w/o contrast without evidence of abscess. CT temporal bone w/ contrast with patent left sigmoid sinus. Persistent mucopurulent drainage seen this AM.   - Abx coverage per primary team  - Ciprodex drops BID x14 days    -- Patient and above plan discussed with chief resident, Dr. Flores, and attending surgeon Dr. Jacqueline Marcelino MD  Otolaryngology-Head & Neck Surgery PGY1  Please contact ENT with questions by dialing [phone] and entering job code [phone] when prompted.

## 2022-01-03 NOTE — PLAN OF CARE
"/77 (BP Location: Right arm)   Pulse 87   Temp 98.7  F (37.1  C) (Oral)   Resp 19   Ht 1.676 m (5' 6\")   Wt 67.1 kg (148 lb)   SpO2 98%   BMI 23.89 kg/m        Neuros: alert and oriented x4. Denies any numbness or tingling. Speaks some english- enough to understand basics.     Cardiac: WDL. Denies chest pain.     Respiratory: WDL. Denies any SOB. On RA sating above 90%.     GI/Gu:  Voiding. No BM this shift.     Skin/Incisions: no issues noted. PIV infusing TKO for antibiotics.     Pain: tylenol given for ear pain.     Diet: NPO for possible procedure.     Activity: SBA.     Plan:  Continue with POC.   "

## 2022-01-03 NOTE — CONSULTS
Care Management Initial Consult    General Information  Assessment completed with: Patient,    Type of CM/SW Visit: Initial Assessment    Primary Care Provider verified and updated as needed:  (does not have a PCP)   Readmission within the last 30 days:        Reason for Consult: discharge planning  Advance Care Planning: Advance Care Planning Reviewed: no concerns identified          Communication Assessment  Patient's communication style: spoken language (English or Bilingual)    Hearing Difficulty or Deaf: no   Wear Glasses or Blind: no    Cognitive  Cognitive/Neuro/Behavioral: WDL                      Living Environment:   People in home: alone     Current living Arrangements:        Able to return to prior arrangements: yes       Family/Social Support:  Care provided by: self  Provides care for: no one   Marital Status: Single             Description of Support System: unable to discuss         Current Resources:   Patient receiving home care services: No     Community Resources: None  Equipment currently used at home: none  Supplies currently used at home:  NA    Employment/Financial:  Employment Status: employed full-time (works at the [employer])        Financial Concerns: No concerns identified           Lifestyle & Psychosocial Needs:  Social Determinants of Health     Tobacco Use: High Risk     Smoking Tobacco Use: Current Some Day Smoker     Smokeless Tobacco Use: Never Used   Alcohol Use: Not on file   Financial Resource Strain: Not on file   Food Insecurity: Not on file   Transportation Needs: Not on file   Physical Activity: Not on file   Stress: Not on file   Social Connections: Not on file   Intimate Partner Violence: Not on file   Depression: Not on file   Housing Stability: Not on file       Functional Status:  Prior to admission patient needed assistance:   Dependent ADLs:: Independent  Dependent IADLs:: Independent  Assesssment of Functional Status: At functional baseline    Mental Health  "Status:  Mental Health Status: No Current Concerns       Chemical Dependency Status:  Chemical Dependency Status: No Current Concerns             Values/Beliefs:  Spiritual, Cultural Beliefs, Anglican Practices, Values that affect care: no (pt has no insurance listed, but reports has insurance)               Additional Information:  Patient is a 42 year old male with acute otitis media and non coalescent mastoiditis in the setting of newly diagnosed diabetes.  Patient currently is requiring IV abx for treatment.  Patient does not have any insurance listed.  Care management consulted for \"New diagnosis of DM without insurance or PCP [facility] follow up\".   Called patient by phone due to COVID+ status to complete care management assessment.  Patient recently moved to [place](did not have time frame) and lives in [place] alone.  He works at an [employer] in [place], [place].  He reports he has insurance through [employer], but does not have his insurance card.  He asked this writer to reach out to [employer], but explained to him that this is something he will need to do.  Explained that he may need IV abx at discharge and will have new medications upon discharge so it is important we get his insurance information.  He reported he will call his employer today.  Plan to f/u with patient tomorrow.  If patient does not have insurance will look into f/u options for the patient.  RNCC will continue to follow and assist with discharge planning.        BERNADETTE Alicea  Phone: [phone]  Pager: [phone]  To contact the weekend RNCC, Page: [phone]        "

## 2022-01-03 NOTE — PLAN OF CARE
"/77 (BP Location: Right arm)   Pulse 87   Temp 98.7  F (37.1  C) (Oral)   Resp 19   Ht 1.676 m (5' 6\")   Wt 67.1 kg (148 lb)   SpO2 98%   BMI 23.89 kg/m      Admitted/transferred from: ED  2 RN full   skin assessment completed by Isatu Sogbeh, INES and Juliana BLUM RN.  Skin assessment finding: skin intact, no problems   Interventions/actions: other pillows used.      Will continue to monitor.    "

## 2022-01-04 LAB
ANION GAP SERPL CALCULATED.3IONS-SCNC: 5 MMOL/L (ref 3–14)
BACTERIA FLD CULT: NORMAL
BUN SERPL-MCNC: 12 MG/DL (ref 7–30)
CALCIUM SERPL-MCNC: 9.3 MG/DL (ref 8.5–10.1)
CHLORIDE BLD-SCNC: 100 MMOL/L (ref 94–109)
CO2 SERPL-SCNC: 30 MMOL/L (ref 20–32)
CREAT SERPL-MCNC: 0.57 MG/DL (ref 0.66–1.25)
CRP SERPL-MCNC: 61 MG/L (ref 0–8)
ERYTHROCYTE [DISTWIDTH] IN BLOOD BY AUTOMATED COUNT: 12 % (ref 10–15)
GFR SERPL CREATININE-BSD FRML MDRD: >90 ML/MIN/1.73M2
GLUCOSE BLD-MCNC: 371 MG/DL (ref 70–99)
GLUCOSE BLDC GLUCOMTR-MCNC: 209 MG/DL (ref 70–99)
GLUCOSE BLDC GLUCOMTR-MCNC: 308 MG/DL (ref 70–99)
GLUCOSE BLDC GLUCOMTR-MCNC: 341 MG/DL (ref 70–99)
HCT VFR BLD AUTO: 43.8 % (ref 40–53)
HGB BLD-MCNC: 14.4 G/DL (ref 13.3–17.7)
MCH RBC QN AUTO: 29.4 PG (ref 26.5–33)
MCHC RBC AUTO-ENTMCNC: 32.9 G/DL (ref 31.5–36.5)
MCV RBC AUTO: 90 FL (ref 78–100)
PLATELET # BLD AUTO: 358 10E3/UL (ref 150–450)
POTASSIUM BLD-SCNC: 4.4 MMOL/L (ref 3.4–5.3)
RBC # BLD AUTO: 4.89 10E6/UL (ref 4.4–5.9)
SODIUM SERPL-SCNC: 135 MMOL/L (ref 133–144)
VANCOMYCIN SERPL-MCNC: 16.8 MG/L
WBC # BLD AUTO: 8.1 10E3/UL (ref 4–11)

## 2022-01-04 PROCEDURE — 258N000003 HC RX IP 258 OP 636: Performed by: STUDENT IN AN ORGANIZED HEALTH CARE EDUCATION/TRAINING PROGRAM

## 2022-01-04 PROCEDURE — 250N000013 HC RX MED GY IP 250 OP 250 PS 637: Performed by: STUDENT IN AN ORGANIZED HEALTH CARE EDUCATION/TRAINING PROGRAM

## 2022-01-04 PROCEDURE — 86140 C-REACTIVE PROTEIN: CPT | Performed by: STUDENT IN AN ORGANIZED HEALTH CARE EDUCATION/TRAINING PROGRAM

## 2022-01-04 PROCEDURE — 272N000452 HC KIT SHRLOCK 5FR POWER PICC TRIPLE LUMEN

## 2022-01-04 PROCEDURE — 99233 SBSQ HOSP IP/OBS HIGH 50: CPT | Mod: GC | Performed by: FAMILY MEDICINE

## 2022-01-04 PROCEDURE — 272N000473 HC KIT, VPS RHYTHM STYLET

## 2022-01-04 PROCEDURE — 80048 BASIC METABOLIC PNL TOTAL CA: CPT | Performed by: STUDENT IN AN ORGANIZED HEALTH CARE EDUCATION/TRAINING PROGRAM

## 2022-01-04 PROCEDURE — 250N000009 HC RX 250: Performed by: STUDENT IN AN ORGANIZED HEALTH CARE EDUCATION/TRAINING PROGRAM

## 2022-01-04 PROCEDURE — 80202 ASSAY OF VANCOMYCIN: CPT | Performed by: FAMILY MEDICINE

## 2022-01-04 PROCEDURE — 272N000201 ZZ HC ADHESIVE SKIN CLOSURE, DERMABOND

## 2022-01-04 PROCEDURE — 250N000011 HC RX IP 250 OP 636: Performed by: FAMILY MEDICINE

## 2022-01-04 PROCEDURE — 36569 INSJ PICC 5 YR+ W/O IMAGING: CPT

## 2022-01-04 PROCEDURE — 250N000011 HC RX IP 250 OP 636: Performed by: STUDENT IN AN ORGANIZED HEALTH CARE EDUCATION/TRAINING PROGRAM

## 2022-01-04 PROCEDURE — 36415 COLL VENOUS BLD VENIPUNCTURE: CPT | Performed by: STUDENT IN AN ORGANIZED HEALTH CARE EDUCATION/TRAINING PROGRAM

## 2022-01-04 PROCEDURE — 120N000002 HC R&B MED SURG/OB UMMC

## 2022-01-04 PROCEDURE — 85027 COMPLETE CBC AUTOMATED: CPT | Performed by: STUDENT IN AN ORGANIZED HEALTH CARE EDUCATION/TRAINING PROGRAM

## 2022-01-04 RX ORDER — LIDOCAINE 40 MG/G
CREAM TOPICAL
Status: DISCONTINUED | OUTPATIENT
Start: 2022-01-04 | End: 2022-01-06 | Stop reason: HOSPADM

## 2022-01-04 RX ORDER — HEPARIN SODIUM,PORCINE 10 UNIT/ML
5-20 VIAL (ML) INTRAVENOUS
Status: DISCONTINUED | OUTPATIENT
Start: 2022-01-04 | End: 2022-01-06 | Stop reason: HOSPADM

## 2022-01-04 RX ORDER — HEPARIN SODIUM,PORCINE 10 UNIT/ML
5-20 VIAL (ML) INTRAVENOUS EVERY 24 HOURS
Status: DISCONTINUED | OUTPATIENT
Start: 2022-01-04 | End: 2022-01-06 | Stop reason: HOSPADM

## 2022-01-04 RX ADMIN — PIPERACILLIN SODIUM AND TAZOBACTAM SODIUM 4.5 G: 4; 500 INJECTION, POWDER, FOR SOLUTION INTRAVENOUS at 14:12

## 2022-01-04 RX ADMIN — PIPERACILLIN SODIUM AND TAZOBACTAM SODIUM 4.5 G: 4; 500 INJECTION, POWDER, FOR SOLUTION INTRAVENOUS at 00:15

## 2022-01-04 RX ADMIN — VANCOMYCIN HYDROCHLORIDE 1250 MG: 100 INJECTION, POWDER, LYOPHILIZED, FOR SOLUTION INTRAVENOUS at 01:27

## 2022-01-04 RX ADMIN — ACETAMINOPHEN 650 MG: 325 TABLET, FILM COATED ORAL at 23:52

## 2022-01-04 RX ADMIN — INSULIN ASPART 3 UNITS: 100 INJECTION, SOLUTION INTRAVENOUS; SUBCUTANEOUS at 22:17

## 2022-01-04 RX ADMIN — CEFEPIME HYDROCHLORIDE 2 G: 2 INJECTION, POWDER, FOR SOLUTION INTRAVENOUS at 20:02

## 2022-01-04 RX ADMIN — GABAPENTIN 300 MG: 300 CAPSULE ORAL at 07:50

## 2022-01-04 RX ADMIN — CIPROFLOXACIN AND DEXAMETHASONE 4 DROP: 3; 1 SUSPENSION/ DROPS AURICULAR (OTIC) at 20:02

## 2022-01-04 RX ADMIN — ENOXAPARIN SODIUM 40 MG: 40 INJECTION SUBCUTANEOUS at 10:12

## 2022-01-04 RX ADMIN — PIPERACILLIN SODIUM AND TAZOBACTAM SODIUM 4.5 G: 4; 500 INJECTION, POWDER, FOR SOLUTION INTRAVENOUS at 05:58

## 2022-01-04 RX ADMIN — VANCOMYCIN HYDROCHLORIDE 1250 MG: 100 INJECTION, POWDER, LYOPHILIZED, FOR SOLUTION INTRAVENOUS at 14:12

## 2022-01-04 RX ADMIN — REMDESIVIR 100 MG: 100 INJECTION, POWDER, LYOPHILIZED, FOR SOLUTION INTRAVENOUS at 12:42

## 2022-01-04 RX ADMIN — GABAPENTIN 300 MG: 300 CAPSULE ORAL at 20:01

## 2022-01-04 RX ADMIN — CIPROFLOXACIN AND DEXAMETHASONE 4 DROP: 3; 1 SUSPENSION/ DROPS AURICULAR (OTIC) at 07:51

## 2022-01-04 RX ADMIN — GABAPENTIN 300 MG: 300 CAPSULE ORAL at 14:11

## 2022-01-04 ASSESSMENT — ACTIVITIES OF DAILY LIVING (ADL)
ADLS_ACUITY_SCORE: 3

## 2022-01-04 NOTE — PLAN OF CARE
"/74 (BP Location: Left arm)   Pulse 80   Temp 98.9  F (37.2  C) (Oral)   Resp 16   Ht 1.676 m (5' 6\")   Wt 67.1 kg (148 lb)   SpO2 98%   BMI 23.89 kg/m      Neuros: A&Ox4; [place] speaking but speaks some english.    Cardiac: WNL. Denies chest pain.   Respiratory: sats mid 90s on RA.  Denies any SOB.   GI/:  pt reports BM today. Voiding adequately, not saving.   IV Access: (R) double lumen PICC. Red lumen infusing TKO with intermittent abx.    Pain: Denies pain.   Diet: moderate CHO, tolerating well. Pt eating meals from home. Unable to carb count.   Activity: Up independently.   Labs: B, 209 and 323.   Others: Insulin teaching completed today but will continue to need reinforcement.      Plan: Possible home tomorrow. Continue with POC.  "

## 2022-01-04 NOTE — PROGRESS NOTES
"Otolaryngology Progress Note  January 4, 2022    S: No acute events overnight.     O: /75 (BP Location: Left arm)   Pulse 98   Temp 99.1  F (37.3  C) (Oral)   Resp 18   Ht 1.676 m (5' 6\")   Wt 67.1 kg (148 lb)   SpO2 96%   BMI 23.89 kg/m    General: laying in bed, no acute distress  HEAD: normocephalic, atraumatic  Ears: R ear WNL. L ear with minimal tenderness to mastoid process. External ear canal open with minimal debris. TM partially visualized, some drainage still present.   Nose: no anterior drainage   Neuro: cranial nerves 2-12 grossly intact  Respiratory: breathing non-labored on RA, no stridor       LABS: Reviewed  WBC 6.8<7.5  Glucoses persistently in the 300s    A/P: Yaana Torrez is a 42 year old male with acute otitis media with likely perforation in the setting of newly diagnosed diabetes. No evidence of coalescent mastoiditis. Clinically improved this morning.   - Abx per primary team  - Ciprodex drops BID x14 days  - Trend CRP    -- Patient and above plan discussed with chief resident, Dr. Flores, and attending surgeon Dr. Chavez Marcelino MD  Otolaryngology-Head & Neck Surgery PGY1  Please contact ENT with questions by dialing [phone] and entering job code [phone] when prompted.    "

## 2022-01-04 NOTE — PLAN OF CARE
"/75 (BP Location: Left arm)   Pulse 98   Temp 99.1  F (37.3  C) (Oral)   Resp 18   Ht 1.676 m (5' 6\")   Wt 67.1 kg (148 lb)   SpO2 96%   BMI 23.89 kg/m        Neuros: alert and oriented x4. Denies any numbness or tingling per pt. Speaks some english- enough to understand basics.      Cardiac: WDL. Denies chest pain.      Respiratory: WDL. Denies any SOB. On RA sating above 90%.      GI/Gu:  Voiding. No BM this shift per pt.      Skin/Incisions: no issues noted. PIV infusing TKO for antibiotics.      Pain: Denies any medication     Diet: CHO 60g.      Activity: SBA to IND in room.     Labs: BS- 332 and 341.      Plan:  Continue with POC.  "

## 2022-01-04 NOTE — PROCEDURES
[facility]    Double Lumen PICC Placement    Date/Time: 1/4/2022 12:24 PM  Performed by: Magy Valentino RN  Authorized by: Dasha Hall DO   Indications: vascular access      UNIVERSAL PROTOCOL   Site Marked: Yes  Prior Images Obtained and Reviewed:  Yes  Required items: Required blood products, implants, devices and special equipment available    Patient identity confirmed:  Verbally with patient and arm band  NA - No sedation, light sedation, or local anesthesia  Confirmation Checklist:  Patient's identity using two indicators, relevant allergies, procedure was appropriate and matched the consent or emergent situation and correct equipment/implants were available  Time out: Immediately prior to the procedure a time out was called    Universal Protocol: the Joint Commission Universal Protocol was followed    Preparation: Patient was prepped and draped in usual sterile fashion       ANESTHESIA    Anesthesia: Local infiltration  Local Anesthetic:  Lidocaine 1% without epinephrine  Anesthetic Total (mL):  4.5      SEDATION    Patient Sedated: No        Preparation: skin prepped with ChloraPrep  Skin prep agent: skin prep agent completely dried prior to procedure  Sterile barriers: maximum sterile barriers were used: cap, mask, sterile gown, sterile gloves, and large sterile sheet  Hand hygiene: hand hygiene performed prior to central venous catheter insertion  Type of line used: PICC and Power PICC  Catheter type: double lumen  Lumen type: non-valved  Catheter size: 5 Fr  Brand: Bard  Lot number: [id number]  Placement method: venipuncture, MST, ultrasound and tip confirmation system  Number of attempts: 1  Successful placement: yes  Orientation: right  Location: basilic vein (0.75 cm vein diameter)  Arm circumference: adults 10 cm  Extremity circumference: 25  Visible catheter length: 2  Total catheter length: 40  Dressing and securement: blood cleaned with CHG,  chlorhexidine disc applied, glue, site cleaned, statlock and sterile dressing applied  Post procedure assessment: blood return through all ports and free fluid flow (BARD)  PROCEDURE   Patient Tolerance:  Patient tolerated the procedure well with no immediate complications

## 2022-01-04 NOTE — PHARMACY-VANCOMYCIN DOSING SERVICE
"Pharmacy Vancomycin Note  Date of Service 2022  Patient's  1980   42 year old, male    Indication: Mastoiditis  Day of Therapy: Started   Current vancomycin regimen:  1250 mg IV q12h  Current vancomycin monitoring method: AUC  Current vancomycin therapeutic monitoring goal: 400-600 mg*h/L    Current estimated CrCl = Estimated Creatinine Clearance: 158.6 mL/min (A) (based on SCr of 0.57 mg/dL (L)).    Creatinine for last 3 days  2022:  3:44 PM Creatinine 0.55 mg/dL  2022:  6:24 AM Creatinine 0.47 mg/dL  1/3/2022:  7:09 AM Creatinine 0.60 mg/dL  2022:  6:50 AM Creatinine 0.57 mg/dL    Recent Vancomycin Levels (past 3 days)  2022:  6:50 AM Vancomycin 16.8 mg/L    Vancomycin IV Administrations (past 72 hours)                   vancomycin 1250 mg in 0.9% NaCl 250 mL intermittent infusion 1,250 mg (mg) 1,250 mg New Bag 22 0127     1,250 mg New Bag 22 1437     1,250 mg New Bag  0152    vancomycin (VANCOCIN) 1,750 mg in sodium chloride 0.9 % 500 mL intermittent infusion (mg) 1,750 mg New Bag 22 1337                Nephrotoxins and other renal medications (From now, onward)    Start     Dose/Rate Route Frequency Ordered Stop    22 1200  piperacillin-tazobactam (ZOSYN) 4.5 g vial to attach to  mL bag        Note to Pharmacy: For [facility], [facility] and [facility]: For Zosyn-naive patients, use the \"Zosyn initial dose + extended infusion\" order panel.    4.5 g  over 30 Minutes Intravenous EVERY 6 HOURS 22 1125      22 0030  vancomycin 1250 mg in 0.9% NaCl 250 mL intermittent infusion 1,250 mg         1,250 mg  over 90 Minutes Intravenous EVERY 12 HOURS 22 1149               Contrast Orders - past 72 hours (72h ago, onward)            Start     Dose/Rate Route Frequency Ordered Stop    22 1455  iopamidol (ISOVUE-370) solution 75 mL         75 mL Intravenous ONCE 22 1451 22 1520          Interpretation of levels and current " regimen:  Vancomycin level is reflective of -600    Has serum creatinine changed greater than 50% in last 72 hours: No    Urine output:  good urine output    Renal Function: Stable    [employer] Prediction of Planned New Vancomycin Regimen  Regimen: 1250 mg IV every 12 hours.  Start time: 13:27 on 01/04/2022  Exposure target: AUC24 (range)400-600 mg/L.hr   AUC24,ss: 421 mg/L.hr  Probability of AUC24 > 400: 61 %  Ctrough,ss: 10.7 mg/L  Probability of Ctrough,ss > 20: 2 %  Probability of nephrotoxicity (Lodise BETZY 2009): 6 %      Plan:  1. Continue Current Dose  2. Vancomycin monitoring method: AUC  3. Vancomycin therapeutic monitoring goal: 400-600 mg*h/L  4. Pharmacy will check vancomycin levels as appropriate in 1-3 Days.  5. Serum creatinine levels will be ordered daily for the first week of therapy and at least twice weekly for subsequent weeks.    KENZIE PAREKH RP

## 2022-01-04 NOTE — PLAN OF CARE
"Blood pressure 125/77, pulse 79, temperature 98.3  F (36.8  C), temperature source Oral, resp. rate 16, height 1.676 m (5' 6\"), weight 67.1 kg (148 lb), SpO2 98 %.      Pt is alert and oriented by 4. Able to make needs known. Remains on special precautions. Up ad amanda for all transfers.  at dinner time-- 8 units of insulin administered. Has food from home at the bedside. Preferred language [place], but pt can communicate well with english. No changes medically. Continue with plan of care.  "

## 2022-01-04 NOTE — PROGRESS NOTES
"Care Management Follow Up    Length of Stay (days): 2    Expected Discharge Date: 01/06/2022     Concerns to be Addressed: discharge planning     Patient plan of care discussed at interdisciplinary rounds: Yes    Anticipated Discharge Disposition: Home,Home Infusion     Anticipated Discharge Services: Home Infusion  Anticipated Discharge DME: None    Patient/family educated on Medicare website which has current facility and service quality ratings: NA  Education Provided on the Discharge Plan: yes   Patient/Family in Agreement with the Plan:yes      Referrals Placed by CM/SW:  Not this date  Private pay costs discussed: no insurance currently     Additional Information:  Spoke with patient to follow up on insurance information.  He reports he spoke with someone at \"[employer]\" and that he should have active insurance starting today.  He reports it is through [employer] and ID #[id number].  Updated [employer] SW, with this information.  Per Eliane it does not appear that he currently has active insurance.  They will follow up and update this writer when more info is known.  RNCC will continue to follow and assist with discharge planning.         BERNADETTE Alicea  Phone: [phone]  Pager: [phone]  To contact the weekend RNCC, Page: [phone]        "

## 2022-01-04 NOTE — PROGRESS NOTES
[facility] Progress Note    Main Plans for Today     - DM education; RN to do informal bedside teaching during daily injections  - increase Lantus to 18U at bedtime, Novalog to 6U TID with meals  - IV Remdesevir Day 3/3  - DVT prophylaxis Lovenox 30mg q24h  - Ciprodex drops BID x14 days  - PICC to be placed and patient education    Assessment & Plan   Mahsa Torrez is a 42 year old male with no medical history admitted on 1/1 for TM perforation with discharge, hyperglycemia in the setting of new diabetes diagnosis, and found to be asymptomatic COVID positive.     #Otomastoiditis  5 days of left ear pain following flight home from [place] followed by sebastian purulent drainage of left ear with soft tissue swelling obscuring the tympanic membrane. On exam this morning, significant tenderness of mastoid, earlobe but not cartilage. CT head c/w acute otomastoiditis. -->61. As patient will need 7d of IV abx, plan for PICC placement prior to discharge.   - IV Vancomycin/Zosyn (Day 3/7)  - PICC ordered; will need teaching prior to discharge  - ENT consulted, appreciate recommendations  - Ciprodex drops BID x 14 days  - Tylenol 650mg q6h prn    Microbiology:  - 1/2/22 ear drainage cx NGTD    Antibiotics:  - Augmentin 1/1 - 1/2  - Vancomycin 1/2 -   - Zosyn 1/2 -   - ciprodex drops BID 1/2 -    # Hyperglycemia  # diabetes mellitus, new dx  # neuropathy  Patient found to have asymptomatic blood glucose of 604 on admission, A1c 13.7, not in DKA. Reports history of bilateral tingling in his feet and lower legs along with recent unintentional weight loss. Started on insulin gtt per protocol and transitioned to subQ. Normal Islet Cell antibody indicates that this is Type 2 DM.  ASCVD 4.3%.Will monitor insulin needs during hospital course and discharge home on appropriate regimen.   - increase Lantus to 18U at bedtime  - increase Novalog to 6U TID with  meals  - moderate carb diet  - MIIS  - hypoglycemia protocol  - diabetes education consult  - Gabapentin 300 TID  - moderate intensity statin at the time of discharge  - follow up at [facility] for virtual visit 7 days after discharge for establishing primary care and chronic disease management    # possibly uninsured  Works at [employer] and reports having health insurance but none documented. Given new diagnosis of diabetes, he will need health insurance to cover medication as well as establish with primary care for chronic disease management.  - social work consult  - re-consult pharmacy once insurance added  - follow up with [facility] for virtual visit 7 days after discharge     #COVID-19 Infection  # unvaccinated for COVID19  Incidental finding. CRP elevated at 210, d dimer normal.  Otherwise remains asymptomatic, 98% on room air.   - IV Remdesevir 3d per 12/30 CDC guidelines  - DVT prophylaxis Lovenox 30mg q24h  - qualifies for vaccination 10 days following positive PCR    Hyponatremia, resolved  Na 130 on admission. Resolved with PO intake    # Pain Assessment:  Current Pain Score 1/3/2022   Patient currently in pain? denies   - Mahsa is experiencing pain due to acute infection. Pain management was discussed and the plan was created in a collaborative fashion.  Yaana's response to the current recommendations: engaged  - Please see the plan for pain management as documented above    Diet: Moderate Consistent Carb (60 g CHO per Meal) Diet  Fluids: PO  DVT Prophylaxis: Enoxaparin (Lovenox) SQ  Code Status: Full Code    Disposition Plan   Expected discharge:  2 - 3 days, recommended to prior living arrangement once antibiotic plan established, safe disposition plan/ TCU bed available and Insulin regimen for new DM diagnosis established        Entered: Dasha Hall 01/04/2022, 11:54 AM   Information in the above section will display in the discharge planner report.      The patient's care was discussed with the Attending  Physician, Dr. Aleja Parker.    Dasha Hall  [facility]  Pager: [phone]  Please see sticky note for cross cover information    Interval History     NAEO. BG remain in the 200-300s despite increasing insulin. Endorses pain much improved. Discussed insulin injections and patient thinks he is capable of 4x/day dosing. He does work 2 jobs so bedtime is variable; discussed watching nursing do injection to learn.       Physical Exam   Vital Signs: Temp: 98.1  F (36.7  C) Temp src: Oral BP: 115/78 Pulse: 79   Resp: 16 SpO2: 99 % O2 Device: None (Room air)    Weight: 148 lbs 0 oz     Physical Exam  Constitutional:       Appearance: Normal appearance.   HENT:      Head: Normocephalic.      Right Ear: Tympanic membrane, ear canal and external ear normal.      Left Ear: No drainage (crusted and dried but not actively draining) or tenderness (mild over inferior mastoid, improved from 1/3).   Eyes:      Conjunctiva/sclera: Conjunctivae normal.      Pupils: Pupils are equal, round, and reactive to light.   Cardiovascular:      Rate and Rhythm: Normal rate.   Pulmonary:      Effort: Pulmonary effort is normal. No accessory muscle usage or respiratory distress.   Musculoskeletal:         General: Normal range of motion.      Cervical back: Normal range of motion.   Skin:     General: Skin is warm and dry.   Neurological:      Mental Status: He is alert and oriented to person, place, and time.   Psychiatric:         Mood and Affect: Mood normal.         Behavior: Behavior normal.       Data   Recent Labs   Lab 01/04/22  0650 01/04/22  0203 01/03/22  2217 01/03/22  0823 01/03/22  0709 01/02/22  0650 01/02/22  0624 01/01/22  2326 01/01/22  2235 01/01/22  1753 01/01/22  1544   WBC 8.1  --   --   --  6.8  --  7.5  --   --   --  6.9   HGB 14.4  --   --   --  14.3  --  14.0  --   --   --  14.6   MCV 90  --   --   --  90  --  89  --   --   --  89     --   --   --  335  --  290  --   --    --  291   INR  --   --   --   --   --   --   --   --  1.08  --   --      --   --   --  138  --  136  --   --   --  130*   POTASSIUM 4.4  --   --   --  4.3  --  3.9  --   --   --  4.3   CHLORIDE 100  --   --   --  104  --  100  --   --   --  91*   CO2 30  --   --   --  29  --  27  --   --   --  33*   BUN 12  --   --   --  13  --  8  --   --   --  12   CR 0.57*  --   --   --  0.60*  --  0.47*  --   --   --  0.55*   ANIONGAP 5  --   --   --  5  --  9  --   --   --  6   SEBASTIÁN 9.3  --   --   --  9.4  --  9.2  --   --   --  9.8   * 341* 332*   < > 212*   < > 203*   < >  --    < > 604*   ALBUMIN  --   --   --   --   --   --   --   --   --   --  2.6*   PROTTOTAL  --   --   --   --   --   --   --   --   --   --  7.4   BILITOTAL  --   --   --   --   --   --   --   --   --   --  0.2   ALKPHOS  --   --   --   --   --   --   --   --   --   --  95   ALT  --   --   --   --   --   --   --   --   --   --  19   AST  --   --   --   --   --   --   --   --   --   --  10    < > = values in this interval not displayed.     No results found for this or any previous visit (from the past 24 hour(s)).

## 2022-01-04 NOTE — PROGRESS NOTES
Antimicrobial Stewardship Team Note    Antimicrobial Stewardship Program - A joint venture between [facility] and  Physicians to optimize antibiotic management.  NOT a formal consult - Restricted Antimicrobial Review     Patient: Mahsa Torrez  MRN: [id number]  Allergies: Patient has no known allergies.    Brief Summary: Mahsa Torrez is a 42 year old male with no significant medical history admitted on 1/1/2022 after 5 days of left ear pain with purulent drainage, currently being treated for mastoiditis. Also with new diagnosis of diabetes and incidental COVID-19 positive.    HPI: Patient reported he was in his normal state of health until 12/27/21 when he developed left ear pain. Patient reports that he had been traveling from [place] on 12/26 and then noticed some discomfort in his ears on 12/27 that had been worsening throughout the week. He also endorsed some whitish discharge and so he presented to the ED for further evaluation. Patient has no known history of inner ear infection or ear surgeries. He denied fevers, chills, sore throat, sinus pain, cough, SOB, abdominal pain, nausea or vomiting. In the ED, patient was afebrile, normotensive, satting well on room air, with normal WBC, but elevated CRP to 210. Given concern for otitis media based on purulent drainage from left ear with soft tissue swelling obscuring the tympanic membrane, patient started on Augmentin on 1/1. Patient also found to have hyperglycemia in the setting of new diabetes diagnosis, and to be asymptomatic COVID positive on 1/1 (unvaccinated).     On 1/2, patient had significant tenderness of the mastoid bone and earlobe, but not cartilage. A CT head was obtained that showed left middle ear and mastoid fluid, consistent with mastoiditis. CT temporal also consistent with otomastoiditis, as well as thinned appearance of the sigmoid plate but no definite evidence of dehiscence. ENT was consulted and Augmentin was switched to  Zosyn and IV vancomycin on 1/2, with the addition of ciprofloxacin ear drops. Per ENT evaluation, patient determined to have AOM and non-coalescent mastoiditis with possible perforation but no abscess. Left ear fluid was collected and sent for culture with no growth on aerobic culture and NGTD x2 days on anaerobic culture. MRSA nares PCR was sent on 1/3 that was negative.     ID was also curbsided for treatment of COVID, and patient completed a 3-day course of remdesivir today. Currently, he is afebrile, normotensive, WBC 8.1, CRP 61.         Active Anti-infective Medications   (From admission, onward)                 Start     Stop    01/02/22 1200  piperacillin-tazobactam  4.5 g,   Intravenous,   EVERY 6 HOURS        mastoiditis        --    01/02/22 0030  vancomycin 1250 mg  1,250 mg,   Intravenous,   EVERY 12 HOURS        Mastoiditis        --                  Assessment: Mastoiditis   Patient who presented with a 5-day history of left ear pain and purulent drainage, originally thought to be otitis media, however CT consistent with non-coalescent mastoiditis. Cultures from the ear fluid have remained negative so these were unable to guide antibiotic therapy. However, given tenderness of mastoid and purulent drainage, agree with broadening of antibiotics from Augmentin to provide Pseudomonal coverage, as well as coverage for the typical gram-positive organisms that can cause mastoiditis (I.e. Streptococcus and Staphylococcus species). Agree with topical ciprofloxacin ear drops, as well.  As ear cultures have not grown MRSA and MRSA nares PCR was negative, recommend to discontinue vancomycin. Secondary intracranial infection is a concern with mastoiditis. While there is no current finding suggesting CNS infection for this patient, there is an argument to be made to switch to an agent with improved CNS penetration. Compared to Zosyn, Cefepime has greater CNS penetration and maintains Pseudomonal coverage, so would  recommend to change Zosyn to cefepime. As the patient continues to improves clinically and is preparing for discharge, consider transition of IV antibiotics to an oral agent with high oral bioavailability, such as levofloxacin. The defined duration for antibiotic treatment for mastoiditis is vague in the literature and is largely based on patient's clinical status.  Anticipate at least 14 days total of appropriate  antibiotic therapy, as wanting to ensure adequate treatment given close proximity to the CNS, along with provider follow-up to determine clinical improvement and whether duration of therapy may need to be extended.     Recommendations:  Discontinue Vancomycin and Zosyn  Initiate Cefepime 2 g IV every 8 hours  Consider transitioning to oral Levaquin upon discharge  Recommend at least 14 days of appropriate antibiotic treatment with potential for longer duration based on patient's clinical status     Discussed with ID Staff: Amina Cantu MD, MPH and Peri Srivastava, PharmD, BCIDP, Betty Mendenhall, PharmD     Mikhail Huntley, PharmD Student  Pager: [phone]    Vital Signs/Clinical Features:  Vitals  Report        01/02 0700  01/03 0659 01/03 0700  01/04 0659 01/04 0700  01/04 1611   Most Recent      Temp ( F) 98.7 -  99.1    98.3 -  99.1    98.1 -  98.9     98.9 (37.2) 01/04 1529    Pulse 80 -  87    79 -  98    79 -  80     80 01/04 1529    Resp 16 -  19    16 -  18      16     16 01/04 1529    /81 -  134/87    121/75 -  125/77    115/74 -  115/78     115/74 01/04 1529    SpO2 (%)   98    96 -  98    98 -  99     98 01/04 1529            Labs  Estimated Creatinine Clearance: 158.6 mL/min (A) (based on SCr of 0.57 mg/dL (L)).  Recent Labs   Lab Test 01/01/22  1544 01/02/22  0624 01/03/22  0709 01/04/22  0650   CR 0.55* 0.47* 0.60* 0.57*       Recent Labs   Lab Test 01/01/22  1544 01/02/22  0624 01/03/22  0709 01/04/22  0650   WBC 6.9 7.5 6.8 8.1   HGB 14.6 14.0 14.3 14.4   HCT 43.2 41.9 43.0 43.8   MCV 89 89  90 90    290 335 358       Recent Labs   Lab Test 01/01/22  1544   BILITOTAL 0.2   ALKPHOS 95   ALBUMIN 2.6*   AST 10   ALT 19       Recent Labs   Lab Test 01/01/22  1544 01/04/22  0650   .0* 61.0*       Recent Labs   Lab Test 01/04/22  0650   VANCOMYCIN 16.8       Culture Results:  7-Day Micro Results       Procedure Component Value Units Date/Time    MRSA MSSA PCR, Nasal Swab [[id number]] Collected: 01/03/22 1225    Order Status: Completed Lab Status: Final result Updated: 01/03/22 1436    Specimen: Swab from Nare, Right      MRSA Target DNA Negative     SA Target DNA Negative    Narrative:      The [employer]  Xpert SA Nasal Complete assay performed in the [employer]  Dx System is a qualitative in vitro diagnostic test designed for rapid detection of Staphylococcus aureus (SA) and methicillin-resistant Staphylococcus aureus (MRSA) from nasal swabs in patients at risk for nasal colonization. The test utilizes automated real-time polymerase chain reaction (PCR) to detect MRSA/SA DNA. The Xpert SA Nasal Complete assay is intended to aid in the prevention and control of MRSA/SA infections in healthcare settings. The assay is not intended to diagnose, guide or monitor treatment for MRSA/SA infections, or provide results of susceptibility to methicillin. A negative result does not preclude MRSA/SA nasal colonization.     Body fluid, unsp Aerobic Bacterial Culture Routine [[id number]] Collected: 01/02/22 1012    Order Status: Completed Lab Status: Final result Updated: 01/04/22 0735    Specimen: Body fluid, unsp from Ear, Left      Culture 1+ Normal charity    Anaerobic Bacterial Culture Routine [[id number]] Collected: 01/02/22 1012    Order Status: Completed Lab Status: Preliminary result Updated: 01/04/22 1346    Specimen: Body fluid, unsp from Ear, Left      Culture No anaerobic organisms isolated after 2 days                                    Imaging: CT Temporal Bones w Contrast    Result Date:  1/2/2022  CT TEMPORAL W CONTRAST 1/2/2022 3:37 PM Provided History: Mastoiditis  Comparison: Same day CT head. Technique: Using multidetector thin collimation helical acquisition technique, axial and coronal thin section CT images were reconstructed through both temporal bones. Additional reconstructions performed in the planes of Poschl and Stenver were also obtained. Images were reviewed in a bone window. Findings: Right temporal bone: The mastoid air cells are clear. There is no debris in the external auditory canal. The tympanic membrane is intact. There is no fluid or soft tissue thickening in the right middle ear cavity. The bony ossicles are intact and in normal alignment. The epitympanum is clear. The bony scutum is sharp. The internal auditory canal and the labyrinthine structures are within normal limits. The facial nerve canal appears normal along its course. Left temporal bone: The mastoid air cells are nearly completely opacified. There is moderate mucosal debris/thickening in the external auditory canal. The tympanic membrane is not well seen due to moderate amount of soft tissue thickening/fluid in the middle ear cavity. The bony ossicles are intact and in normal alignment. The epitympanum is clear. The bony scutum is sharp. The internal auditory canal and the labyrinthine structures are within normal limits. The facial nerve canal appears normal along its course. Thinned appearance of the sigmoid plate (series 4, image 70) without any definite evidence of dehiscence. Suboptimal contrast timing limits evaluation of the left sigmoid sinus, but no definite filling defect or thrombus is identified. The right sigmoid sinuses are small and difficult to visualize.     Impression:  1. Findings compatible with acute left-sided otomastoiditis. There is thinned appearance of the sigmoid plate but no definite evidence of dehiscence. 2. Suboptimal contrast timing limits evaluation of the left sigmoid sinus, but  no definite filling defect or thrombus is identified. If there is continued clinical concern, consider head MRV. I have personally reviewed the examination and initial interpretation and I agree with the findings. CHAD PAREKH MD   SYSTEM ID:  [id number]    CT Head w/o Contrast    Result Date: 1/4/2022  CT HEAD W/O CONTRAST 1/2/2022 10:22 AM History: Ear pain, Rule out mastoiditis. Comparison: None. Technique: Using multidetector thin collimation helical acquisition technique, axial, coronal and sagittal CT images from the skull base to the vertex were obtained without intravenous contrast.  (topogram) image(s) also obtained and reviewed. Findings: There is no intracranial hemorrhage, mass effect, or midline shift. Gray/white matter differentiation in both cerebral hemispheres is preserved. Ventricles are proportionate to the cerebral sulci. The basal cisterns are clear. The bony calvaria and the bones of the skull base are normal. Visualized paranasal sinuses are clear. The left mastoid air cells are nearly completely opacified. Fluid and soft tissue thickening is seen in the middle ear as well.     Impression: Left middle ear and mastoid fluid, potentially otomastoiditis. Otherwise, no acute intracranial pathology. I have personally reviewed the examination and initial interpretation and I agree with the findings. LENKA OQUENDO MD   SYSTEM ID:  [id number]

## 2022-01-05 LAB
ANION GAP SERPL CALCULATED.3IONS-SCNC: 6 MMOL/L (ref 3–14)
BUN SERPL-MCNC: 12 MG/DL (ref 7–30)
CALCIUM SERPL-MCNC: 8.9 MG/DL (ref 8.5–10.1)
CHLORIDE BLD-SCNC: 101 MMOL/L (ref 94–109)
CO2 SERPL-SCNC: 27 MMOL/L (ref 20–32)
CREAT SERPL-MCNC: 0.49 MG/DL (ref 0.66–1.25)
CRP SERPL-MCNC: 45 MG/L (ref 0–8)
ERYTHROCYTE [DISTWIDTH] IN BLOOD BY AUTOMATED COUNT: 12 % (ref 10–15)
GAD65 AB SER IA-ACNC: <5 IU/ML
GFR SERPL CREATININE-BSD FRML MDRD: >90 ML/MIN/1.73M2
GLUCOSE BLD-MCNC: 402 MG/DL (ref 70–99)
GLUCOSE BLDC GLUCOMTR-MCNC: 291 MG/DL (ref 70–99)
GLUCOSE BLDC GLUCOMTR-MCNC: 294 MG/DL (ref 70–99)
GLUCOSE BLDC GLUCOMTR-MCNC: 301 MG/DL (ref 70–99)
GLUCOSE BLDC GLUCOMTR-MCNC: 335 MG/DL (ref 70–99)
GLUCOSE BLDC GLUCOMTR-MCNC: 371 MG/DL (ref 70–99)
HCT VFR BLD AUTO: 39.5 % (ref 40–53)
HGB BLD-MCNC: 13.3 G/DL (ref 13.3–17.7)
MCH RBC QN AUTO: 30 PG (ref 26.5–33)
MCHC RBC AUTO-ENTMCNC: 33.7 G/DL (ref 31.5–36.5)
MCV RBC AUTO: 89 FL (ref 78–100)
PLATELET # BLD AUTO: 343 10E3/UL (ref 150–450)
POTASSIUM BLD-SCNC: 4.3 MMOL/L (ref 3.4–5.3)
RBC # BLD AUTO: 4.43 10E6/UL (ref 4.4–5.9)
SODIUM SERPL-SCNC: 134 MMOL/L (ref 133–144)
WBC # BLD AUTO: 7.5 10E3/UL (ref 4–11)

## 2022-01-05 PROCEDURE — 86140 C-REACTIVE PROTEIN: CPT | Performed by: STUDENT IN AN ORGANIZED HEALTH CARE EDUCATION/TRAINING PROGRAM

## 2022-01-05 PROCEDURE — 250N000011 HC RX IP 250 OP 636: Performed by: STUDENT IN AN ORGANIZED HEALTH CARE EDUCATION/TRAINING PROGRAM

## 2022-01-05 PROCEDURE — 120N000002 HC R&B MED SURG/OB UMMC

## 2022-01-05 PROCEDURE — 250N000011 HC RX IP 250 OP 636: Performed by: FAMILY MEDICINE

## 2022-01-05 PROCEDURE — 250N000013 HC RX MED GY IP 250 OP 250 PS 637: Performed by: STUDENT IN AN ORGANIZED HEALTH CARE EDUCATION/TRAINING PROGRAM

## 2022-01-05 PROCEDURE — 99232 SBSQ HOSP IP/OBS MODERATE 35: CPT | Mod: GC | Performed by: FAMILY MEDICINE

## 2022-01-05 PROCEDURE — 85027 COMPLETE CBC AUTOMATED: CPT | Performed by: STUDENT IN AN ORGANIZED HEALTH CARE EDUCATION/TRAINING PROGRAM

## 2022-01-05 PROCEDURE — 80048 BASIC METABOLIC PNL TOTAL CA: CPT | Performed by: STUDENT IN AN ORGANIZED HEALTH CARE EDUCATION/TRAINING PROGRAM

## 2022-01-05 PROCEDURE — 36592 COLLECT BLOOD FROM PICC: CPT | Performed by: STUDENT IN AN ORGANIZED HEALTH CARE EDUCATION/TRAINING PROGRAM

## 2022-01-05 RX ADMIN — GABAPENTIN 300 MG: 300 CAPSULE ORAL at 13:03

## 2022-01-05 RX ADMIN — CIPROFLOXACIN AND DEXAMETHASONE 4 DROP: 3; 1 SUSPENSION/ DROPS AURICULAR (OTIC) at 20:50

## 2022-01-05 RX ADMIN — CEFEPIME HYDROCHLORIDE 2 G: 2 INJECTION, POWDER, FOR SOLUTION INTRAVENOUS at 04:32

## 2022-01-05 RX ADMIN — CIPROFLOXACIN AND DEXAMETHASONE 4 DROP: 3; 1 SUSPENSION/ DROPS AURICULAR (OTIC) at 08:19

## 2022-01-05 RX ADMIN — ENOXAPARIN SODIUM 40 MG: 40 INJECTION SUBCUTANEOUS at 13:03

## 2022-01-05 RX ADMIN — GABAPENTIN 300 MG: 300 CAPSULE ORAL at 20:49

## 2022-01-05 RX ADMIN — GABAPENTIN 300 MG: 300 CAPSULE ORAL at 08:19

## 2022-01-05 RX ADMIN — CEFEPIME HYDROCHLORIDE 2 G: 2 INJECTION, POWDER, FOR SOLUTION INTRAVENOUS at 20:49

## 2022-01-05 RX ADMIN — CEFEPIME HYDROCHLORIDE 2 G: 2 INJECTION, POWDER, FOR SOLUTION INTRAVENOUS at 13:02

## 2022-01-05 RX ADMIN — Medication 5 ML: at 13:03

## 2022-01-05 ASSESSMENT — ACTIVITIES OF DAILY LIVING (ADL)
ADLS_ACUITY_SCORE: 3

## 2022-01-05 NOTE — PLAN OF CARE
"/74 (BP Location: Left arm)   Pulse 91   Temp 98.6  F (37  C) (Oral)   Resp 16   Ht 1.676 m (5' 6\")   Wt 67.1 kg (148 lb)   SpO2 99%   BMI 23.89 kg/m      Neuros: A&Ox4; [place] speaking but speaks some english.    Cardiac: WNL. Denies chest pain.   Respiratory: sats mid 90s on RA.  Denies any SOB.   GI/:  pt reports BM this morning. Voiding adequately, not saving.   IV Access: (R) double lumen PICC. Red lumen infusing TKO with intermittent abx.  Other lumen- hep locked.   Pain: Denies pain, c/o (L) ear discomfort.    Diet: moderate CHO, tolerating well. Pt eating most meals today from room service.   Activity: Up independently.   Labs: B, 301 and 335   Others: Insulin teaching reinforced today. Pt was able to demonstrate how to read orders and administer insulin. Will continue to need reinforcement.       Plan: Possible home tomorrow pending BGs and abx plan. Continue with POC.  "

## 2022-01-05 NOTE — PROGRESS NOTES
"Otolaryngology Progress Note  January 5, 2022    S: No acute events overnight.     O: /69 (BP Location: Left arm)   Pulse 89   Temp 97.5  F (36.4  C) (Oral)   Resp 16   Ht 1.676 m (5' 6\")   Wt 67.1 kg (148 lb)   SpO2 97%   BMI 23.89 kg/m    General: laying in bed, no acute distress  HEAD: normocephalic, atraumatic  Ears: R ear WNL. L ear with no tenderness to mastoid process. External ear canal open with minimal debris. TM partially visualized, some mucoid drainage still present.   Nose: no anterior drainage   Neuro: cranial nerves 2-12 grossly intact  Respiratory: breathing non-labored on RA, no stridor       LABS: Reviewed  WBC WNL  Glucoses persistently in the 300s  CRP downtrending    A/P: Yaana Torrez is a 42 year old male with acute otitis media with likely perforation in the setting of newly diagnosed diabetes. No evidence of coalescent mastoiditis. Clinically continues to improve. No tenderness to mastoid process. Drainage continues to decrease.   - Abx per primary team  - Ciprodex drops BID x14 days  - Trend CRP  - We will schedule follow up in 1-2 weeks     ENT will sign off    -- Patient and above plan discussed with chief resident, Dr. Flores, and attending surgeon Dr. Chavez Marcelino MD  Otolaryngology-Head & Neck Surgery PGY1  Please contact ENT with questions by dialing [phone] and entering job code [phone] when prompted.    "

## 2022-01-05 NOTE — PROGRESS NOTES
Care Management Follow Up    Length of Stay (days): 3    Expected Discharge Date: 01/06/2022     Concerns to be Addressed: discharge planning     Patient plan of care discussed at interdisciplinary rounds: Yes    Anticipated Discharge Disposition: Home     Anticipated Discharge Services: OP f/u  Anticipated Discharge DME: None    Patient/family educated on Medicare website which has current facility and service quality ratings: NA   Education Provided on the Discharge Plan: Yes  Patient/Family in Agreement with the Plan:  Yes    Referrals Placed by CM/SW: NA   Private pay costs discussed: Not applicable    Additional Information:  MD team requesting patient be set up at the [facility]) for follow up at discharge.  Arranged an appt for patient 1/18 at 11am at the [facility] with Dr. Bernadette Chew, this was added to his AVS.  Patient updated with this information.  Patient now with active insurance, but per financial counselor it is an insurance that has 60 days max coverage and has minimal coverage.  RNCC will continue to follow and assist with discharge planning.        BERNADETTE Alicea  Phone: [phone]  Pager: [phone]  To contact the weekend RNCC, Page: [phone]

## 2022-01-05 NOTE — CONSULTS
Discharge Pharmacy Test Claim    Pharmacy liaison called patient's insurance company [employer] (ph: [phone]). Patient will have to pay 100% out of pocket for prescriptions and manually submit itemized prescription claims for reimbursement. Representative explained that the patient is only allowed 12 prescriptions reimbursements per year. Patient may be reimbursed $40 for each brand name and $10 for each generic prescription. Expected costs for diabetic supplies and insulin without insurance are listed below.    Test Claim Cost Note   accu-chek guide meter 12.50 will apply a free trial voucher   accu-chek fastclix lancets 12.50 102 ct   accu-chek guide test strips 30.00 100 ct    lantus solostar 716.00 will apply an emergency use voucher   novolog flexpens 547.00 will apply an emergency use voucher     Resources  Discharge pharmacy will provide an accu-chek guide glucometer voucher. Pharmacy liaison added a 30-day emergency use voucher for lantus and novolog onto patient's pharmacy profile. Please refer patient to [email or web address] for insulin resources post discharge.     [place]: Information for [place] - [place]://[place].gov/boards[place]assets/ISNP-Information-Sheets-for-Patients%2010.21.2021_tcm21-737048.pdf      Accu-chek glucometer voucher      Lantus voucher    Novolog voucher    Dasha Garcia  [facility] Pharmacy Liaison  Ph: [phone] Pager: [phone]

## 2022-01-05 NOTE — PROGRESS NOTES
[facility] Medicine Progress Note    Main Plans for Today     - DM education; RN to do informal bedside teaching during daily injections  - 51 U insulin yesterday in total. Increase Lantus to 23U at bedtime, Novalog to 8U TID with meals  - ENT signed off    Assessment & Plan   Mahsa Torrez is a 42 year old male with no medical history admitted on 1/1 for TM perforation with discharge, hyperglycemia in the setting of new diabetes diagnosis, and found to be asymptomatic COVID positive.     #mastoiditis  5 days of left ear pain following flight home from [place] followed by [facility] purulent drainage of left ear with soft tissue swelling obscuring the tympanic membrane. On admission with significant tenderness of mastoid, earlobe but not cartilage. CT head c/w acute otomastoiditis. -->45. Seen by antimicrobial stewardship on 1/4, recommending 14 day course of abx with continuation of IV while inpatient. PICC placed 1/4. Currently on cefepime TID, will transition to oral Levaquin at time of discharge.   - IV Vancomycin/Zosyn (1/2-1/4)  - cefepime 2 g TID (1/4/21-1/8/21)  - PICC placed 1/4; will need teaching prior to discharge  - ENT consulted, appreciate recommendations  - Ciprodex drops BID x 14 days  - Tylenol 650mg q6h prn    Microbiology:  - 1/2/22 ear drainage cx NGTD    Antibiotics:   - Augmentin 1/1 - 1/2  - Vancomycin 1/2 - 1/4  - Zosyn 1/2 - 1/4  - cefepime 1/4 -   - Levaquin upon discharge to complete 14 days total antibiotics  - ciprodex drops BID 1/2 -    # Hyperglycemia  # Diabetes mellitus, new dx  # neuropathy, improving  Patient found to have asymptomatic blood glucose of 604 on admission, A1c 13.7, not in DKA. Reports history of bilateral tingling in his feet and lower legs along with recent unintentional weight loss. Started on insulin gtt per protocol and transitioned to subQ. Normal Islet Cell antibody indicates that this is  Type 2 DM.  ASCVD 4.3%. Monitor insulin needs during hospital course and discharge home on appropriate regimen.   - increase Lantus to 23U at bedtime  - increase Novalog to 8U TID with meals  - moderate carb diet and recommendation to limit meals to three per day  - HIIS  - hypoglycemia protocol  - diabetes education consulted  - Gabapentin 300 TID  - moderate intensity statin at the time of discharge  - follow up at [facility] within 7 days of discharge for establishing primary care and chronic disease management      # Social work consult  # possibly uninsured, resolved   Works at [employer] was without insurance. Established with limited insurance coverage as of 1/4. Plan was to follow up at [facility] at discharge but needing to confirm.   - follow up with [facility] set up for 1/18 at 11am      #COVID-19 Infection  # unvaccinated for COVID19  Incidental finding. CRP elevated at 210, d dimer normal.  Otherwise remains asymptomatic, 98% on room air.   - IV Remdesevir 3d (per 12/30 CDC guidelines), completed 1/4  - DVT prophylaxis Lovenox 30mg q24h  - qualifies for vaccination 10 days following positive PCR    Hyponatremia, resolved   Na 130 on admission. Resolved with PO intake    # Pain Assessment:  Current Pain Score 1/4/2022   Patient currently in pain? denies   - Mahsa is experiencing pain due to acute infection. Pain management was discussed and the plan was created in a collaborative fashion.  Mahsa's response to the current recommendations: engaged  - Please see the plan for pain management as documented above    Diet: Moderate Consistent Carb (60 g CHO per Meal) Diet  Fluids: PO  DVT Prophylaxis: Enoxaparin (Lovenox) SQ  Code Status: Full Code    Disposition Plan   Expected discharge:  2 - 3 days, recommended to prior living arrangement once antibiotic plan established and BG levels in low 200s/safe for dispo with appropriate follow up scheduled         Entered:  May Mai 01/05/2022, 8:09 AM   Information in the above section will display in the discharge planner report.      The patient's care was discussed with the Attending Physician, Dr. Aleja Parker .    May Mai MD, PGY-2  [facility]: [phone]  Pager: [phone]  Please see sticky note for cross cover information    Interval History     NAEO. Feels pain around ear/mastoid much better today. Also not noticing as much foot pain/numbness as he did at time of admission. States he is eating well although eating 3 meals a day plus significant snacking between meals.       Physical Exam   Vital Signs: Temp: 99.3  F (37.4  C) Temp src: Oral BP: 108/64 Pulse: 85   Resp: 16 SpO2: 96 % O2 Device: None (Room air)    Weight: 148 lbs 0 oz     Physical Exam  Constitutional:       Appearance: Normal appearance.   HENT:      Head: Normocephalic.      Right Ear: Tympanic membrane, ear canal and external ear normal.      Left Ear: No drainage or tenderness.   Eyes:      Extraocular Movements: Extraocular movements intact.      Conjunctiva/sclera: Conjunctivae normal.   Cardiovascular:      Rate and Rhythm: Normal rate.   Pulmonary:      Effort: Pulmonary effort is normal. No accessory muscle usage or respiratory distress.   Musculoskeletal:         General: Normal range of motion.      Cervical back: Normal range of motion.   Skin:     General: Skin is warm and dry.   Neurological:      Mental Status: He is alert and oriented to person, place, and time.   Psychiatric:         Mood and Affect: Mood normal.         Behavior: Behavior normal.       Data   Recent Labs   Lab 01/05/22  0636 01/05/22  0223 01/04/22  2216 01/04/22  1333 01/04/22  0650 01/03/22  0823 01/03/22  0709 01/01/22  2326 01/01/22  2235 01/01/22  1753 01/01/22  1544   WBC 7.5  --   --   --  8.1  --  6.8   < >  --   --  6.9   HGB 13.3  --   --   --  14.4  --  14.3   < >  --   --  14.6   MCV 89  --   --   --  90  --  90    < >  --   --  89     --   --   --  358  --  335   < >  --   --  291   INR  --   --   --   --   --   --   --   --  1.08  --   --      --   --   --  135  --  138   < >  --   --  130*   POTASSIUM 4.3  --   --   --  4.4  --  4.3   < >  --   --  4.3   CHLORIDE 101  --   --   --  100  --  104   < >  --   --  91*   CO2 27  --   --   --  30  --  29   < >  --   --  33*   BUN 12  --   --   --  12  --  13   < >  --   --  12   CR 0.49*  --   --   --  0.57*  --  0.60*   < >  --   --  0.55*   ANIONGAP 6  --   --   --  5  --  5   < >  --   --  6   SEBASTIÁN 8.9  --   --   --  9.3  --  9.4   < >  --   --  9.8   * 291* 308*   < > 371*   < > 212*   < >  --    < > 604*   ALBUMIN  --   --   --   --   --   --   --   --   --   --  2.6*   PROTTOTAL  --   --   --   --   --   --   --   --   --   --  7.4   BILITOTAL  --   --   --   --   --   --   --   --   --   --  0.2   ALKPHOS  --   --   --   --   --   --   --   --   --   --  95   ALT  --   --   --   --   --   --   --   --   --   --  19   AST  --   --   --   --   --   --   --   --   --   --  10    < > = values in this interval not displayed.     No results found for this or any previous visit (from the past 24 hour(s)).

## 2022-01-05 NOTE — PROGRESS NOTES
"Rdz-vz-weobb progress note. Care from: 19:00 - 23:00     /74 (BP Location: Left arm)   Pulse 80   Temp 98.9  F (37.2  C) (Oral)   Resp 16   Ht 1.676 m (5' 6\")   Wt 67.1 kg (148 lb)   SpO2 98%   BMI 23.89 kg/m       /64 (BP Location: Left arm)   Pulse 85   Temp 99.3  F (37.4  C) (Oral)   Resp 16   Ht 1.676 m (5' 6\")   Wt 67.1 kg (148 lb)   SpO2 96%   BMI 23.89 kg/m          Vitals: VSS     Neuro: WDL   o Pain: Denies   o Mood/Behavior: Calm, cooperative     Activity: Up ad amanda in room     Cardiovascular: WDL     Respiratory: WDL     GI & Nutrition: WDL   o Nausea: Denies   o Bowel Movement:     : WDL     Skin, Wounds & LDAs: No observed skin deficits. PICC intact, infusing fluids TKO between abx.     Notable Labs: Persistent hyperglycemia. Difficult to carb count as patient is eating and snacking on meals from home/family without nutrition information.     Events & Plan Updates: Possible discharge tomorrow pending improvement in glycemic status   "

## 2022-01-05 NOTE — PLAN OF CARE
"/64 (BP Location: Left arm)   Pulse 85   Temp 99.3  F (37.4  C) (Oral)   Resp 16   Ht 1.676 m (5' 6\")   Wt 67.1 kg (148 lb)   SpO2 96%   BMI 23.89 kg/m        Neuros: alert and oriented x4. Denies any numbness or tingling per pt. Speaks some english- enough to understand basics.      Cardiac: WDL. Denies chest pain.      Respiratory: WDL. Denies any SOB. On RA sating above 90%.      GI/Gu:  Voiding. BM this shift.      Skin/Incisions: no issues noted. PICC infusing TKO for antibiotics.      Pain: Denies any medication     Diet: CHO 60g.      Activity:  IND in room.      Labs: BS- 291     Plan:  Continue with POC.  "

## 2022-01-05 NOTE — DISCHARGE INSTRUCTIONS
[facility])  [address] [place] [address]  Appointment to establish care with   Dr. Bernadette Chew on 1/18 at 11am

## 2022-01-06 ENCOUNTER — APPOINTMENT (OUTPATIENT)
Dept: EDUCATION SERVICES | Facility: CLINIC | Age: 42
DRG: 637 | End: 2022-01-06

## 2022-01-06 ENCOUNTER — APPOINTMENT (OUTPATIENT)
Dept: EDUCATION SERVICES | Facility: CLINIC | Age: 42
DRG: 637 | End: 2022-01-06
Payer: COMMERCIAL

## 2022-01-06 VITALS
OXYGEN SATURATION: 97 % | TEMPERATURE: 98.4 F | HEART RATE: 76 BPM | RESPIRATION RATE: 16 BRPM | SYSTOLIC BLOOD PRESSURE: 123 MMHG | BODY MASS INDEX: 23.78 KG/M2 | HEIGHT: 66 IN | DIASTOLIC BLOOD PRESSURE: 71 MMHG | WEIGHT: 148 LBS

## 2022-01-06 LAB
ANION GAP SERPL CALCULATED.3IONS-SCNC: 6 MMOL/L (ref 3–14)
BUN SERPL-MCNC: 11 MG/DL (ref 7–30)
CALCIUM SERPL-MCNC: 7.9 MG/DL (ref 8.5–10.1)
CHLORIDE BLD-SCNC: 106 MMOL/L (ref 94–109)
CO2 SERPL-SCNC: 23 MMOL/L (ref 20–32)
CREAT SERPL-MCNC: 0.45 MG/DL (ref 0.66–1.25)
CRP SERPL-MCNC: 48 MG/L (ref 0–8)
ERYTHROCYTE [DISTWIDTH] IN BLOOD BY AUTOMATED COUNT: 12 % (ref 10–15)
GFR SERPL CREATININE-BSD FRML MDRD: >90 ML/MIN/1.73M2
GLUCOSE BLD-MCNC: 257 MG/DL (ref 70–99)
GLUCOSE BLDC GLUCOMTR-MCNC: 234 MG/DL (ref 70–99)
GLUCOSE BLDC GLUCOMTR-MCNC: 251 MG/DL (ref 70–99)
GLUCOSE BLDC GLUCOMTR-MCNC: 277 MG/DL (ref 70–99)
GLUCOSE BLDC GLUCOMTR-MCNC: 323 MG/DL (ref 70–99)
HCT VFR BLD AUTO: 41.6 % (ref 40–53)
HGB BLD-MCNC: 13.5 G/DL (ref 13.3–17.7)
MCH RBC QN AUTO: 29.5 PG (ref 26.5–33)
MCHC RBC AUTO-ENTMCNC: 32.5 G/DL (ref 31.5–36.5)
MCV RBC AUTO: 91 FL (ref 78–100)
PLATELET # BLD AUTO: 297 10E3/UL (ref 150–450)
POTASSIUM BLD-SCNC: 4.1 MMOL/L (ref 3.4–5.3)
RBC # BLD AUTO: 4.58 10E6/UL (ref 4.4–5.9)
SODIUM SERPL-SCNC: 135 MMOL/L (ref 133–144)
WBC # BLD AUTO: 7.5 10E3/UL (ref 4–11)

## 2022-01-06 PROCEDURE — 85048 AUTOMATED LEUKOCYTE COUNT: CPT | Performed by: STUDENT IN AN ORGANIZED HEALTH CARE EDUCATION/TRAINING PROGRAM

## 2022-01-06 PROCEDURE — 99239 HOSP IP/OBS DSCHRG MGMT >30: CPT | Mod: GC | Performed by: FAMILY MEDICINE

## 2022-01-06 PROCEDURE — 80048 BASIC METABOLIC PNL TOTAL CA: CPT | Performed by: STUDENT IN AN ORGANIZED HEALTH CARE EDUCATION/TRAINING PROGRAM

## 2022-01-06 PROCEDURE — 250N000011 HC RX IP 250 OP 636: Performed by: STUDENT IN AN ORGANIZED HEALTH CARE EDUCATION/TRAINING PROGRAM

## 2022-01-06 PROCEDURE — 250N000011 HC RX IP 250 OP 636: Performed by: FAMILY MEDICINE

## 2022-01-06 PROCEDURE — 250N000013 HC RX MED GY IP 250 OP 250 PS 637: Performed by: STUDENT IN AN ORGANIZED HEALTH CARE EDUCATION/TRAINING PROGRAM

## 2022-01-06 PROCEDURE — 86140 C-REACTIVE PROTEIN: CPT | Performed by: STUDENT IN AN ORGANIZED HEALTH CARE EDUCATION/TRAINING PROGRAM

## 2022-01-06 PROCEDURE — 36592 COLLECT BLOOD FROM PICC: CPT | Performed by: STUDENT IN AN ORGANIZED HEALTH CARE EDUCATION/TRAINING PROGRAM

## 2022-01-06 RX ORDER — INSULIN ASPART 100 [IU]/ML
INJECTION, SOLUTION INTRAVENOUS; SUBCUTANEOUS
Qty: 15 ML | Refills: 0 | Status: SHIPPED | OUTPATIENT
Start: 2022-01-06 | End: 2022-02-23

## 2022-01-06 RX ORDER — CIPROFLOXACIN AND DEXAMETHASONE 3; 1 MG/ML; MG/ML
4 SUSPENSION/ DROPS AURICULAR (OTIC) 2 TIMES DAILY
Qty: 7.5 ML | Refills: 0 | Status: SHIPPED | OUTPATIENT
Start: 2022-01-06

## 2022-01-06 RX ORDER — LEVOFLOXACIN 750 MG/1
750 TABLET, FILM COATED ORAL DAILY
Qty: 10 TABLET | Refills: 0 | Status: SHIPPED | OUTPATIENT
Start: 2022-01-06 | End: 2022-01-16

## 2022-01-06 RX ORDER — GABAPENTIN 300 MG/1
300 CAPSULE ORAL 3 TIMES DAILY
Qty: 90 CAPSULE | Refills: 0 | Status: SHIPPED | OUTPATIENT
Start: 2022-01-06 | End: 2022-02-25

## 2022-01-06 RX ORDER — CONTAINER,EMPTY
1 EACH MISCELLANEOUS DAILY
Qty: 1 EACH | Refills: 0 | Status: SHIPPED | OUTPATIENT
Start: 2022-01-06

## 2022-01-06 RX ADMIN — CIPROFLOXACIN AND DEXAMETHASONE 4 DROP: 3; 1 SUSPENSION/ DROPS AURICULAR (OTIC) at 08:01

## 2022-01-06 RX ADMIN — Medication 5 ML: at 12:55

## 2022-01-06 RX ADMIN — ENOXAPARIN SODIUM 40 MG: 40 INJECTION SUBCUTANEOUS at 12:55

## 2022-01-06 RX ADMIN — GABAPENTIN 300 MG: 300 CAPSULE ORAL at 16:42

## 2022-01-06 RX ADMIN — CEFEPIME HYDROCHLORIDE 2 G: 2 INJECTION, POWDER, FOR SOLUTION INTRAVENOUS at 03:30

## 2022-01-06 RX ADMIN — GABAPENTIN 300 MG: 300 CAPSULE ORAL at 07:59

## 2022-01-06 RX ADMIN — CEFEPIME HYDROCHLORIDE 2 G: 2 INJECTION, POWDER, FOR SOLUTION INTRAVENOUS at 12:55

## 2022-01-06 ASSESSMENT — ACTIVITIES OF DAILY LIVING (ADL)
ADLS_ACUITY_SCORE: 3

## 2022-01-06 NOTE — PLAN OF CARE
"Vital signs:  Temp: 98.5  F (36.9  C) Temp src: Oral BP: 114/70 Pulse: 89   Resp: 16 SpO2: 96 % O2 Device: None (Room air)   Height: 167.6 cm (5' 6\") Weight: 67.1 kg (148 lb)    5174-5061:    Activity: Up independently in room.   Neuros: A & O x4. Neuro intact.   Cardiac: WDL. Asymptomatic.   Respiratory: LS diminished. O2 sats high 90s on RA. Denies SOB. Unlabored.   GI/: BS+, passing flatus, had BM yesterday. Voiding, not saving.   Diet: Mod CHO diet.   Skin: Left ear has small yellow drainage, crusty, Ciprodex administered per orders.   Lines: Cefepime 2g infused per orders, otherwise TKO via double lumen PICC, one port saline locked.  Incisions/Drains: None.   Labs:  at bedtime, administered 4 units sliding scale insulin. Lantus 23 units administered per orders.   Pain/nausea: Denies pain. Has occasional left ear pain. Denies nausea.   New changes this shift: None.   Plan: Continue POC.     "

## 2022-01-06 NOTE — PROGRESS NOTES
Care Management Discharge Note    Discharge Date: 01/06/2022       Discharge Disposition: Home    Discharge Services: OP f/u    Discharge DME: None    Discharge Transportation: family or friend will provide    Private pay costs discussed: Not applicable    PAS Confirmation Code: NA   Patient/family educated on Medicare website which has current facility and service quality ratings: NA      Education Provided on the Discharge Plan: yes   Persons Notified of Discharge Plans: yes  Patient/Family in Agreement with the Plan: yes     Handoff Referral Completed: No    Additional Information:  Per MD team patient is medically ready for discharge to home today.  Patient is getting PLC for diabetes education today.  Patient getting a glucometer, novolog flexpens, and basaglar with vouchers(see pharmacy liaison note for details).  Patient will need to apply with the [place] Insulin Safety Net Program to get continued urgent coverage.  [place] Ins[place]: Information for Patients - https://[place].gov/boards/assets/ISNP-Information-Sheets-for-Patients%2010.21.2021_tcm21-664862.pdf    Packet printed and given to patient.  Pt to f/u with the [facility] clinic on 1/18.  RNCC will continue to follow and assist with discharge planning.           BERNADETTE Alicea  Phone: [phone]  Pager: [phone]  To contact the weekend RNCC, Page: [phone]

## 2022-01-06 NOTE — CONSULTS
.  .                                                      At [facility], one important tool we use to improve our patient services is our Patient Survey.  Following your visit you may receive our survey in the mail or by email.    Please take the time to complete the survey.    If your visit with us was great, we want to hear about it.    If we can improve, please let us know how.            Mahsa was seen for diabetes education. He has been working with his nurse on insulin administration and said he understood and did not need review. I did show him the difference between hospital home insulin pen needles and he understood. I then taught him how to use the Accu Chek guide meter. He performed a blood glucose and had a result of 243. He has meter at bedside and will use it to check his blood sugar with staff the next time it is due. Please review all skills with Mahsa prior to discharge home to ensure he is competent to perform cares at home.    Literature Given: Understanding Diabetes, My Diabetes Treatment Plan, Long-Acting Insulin, Rapid-Acting Insulin,   Accu Chek Guide meter and supplies from pharmacy

## 2022-01-06 NOTE — PHARMACY-RX INSURANCE COVERAGE
Patient Assistance Enrollment    Discharge pharmacy had trouble with lantus voucher and switched to basaglar. Applied basaglar urgent use voucher.       Dasha Garcia  [facility] Pharmacy Liaison  Ph: [phone] Page: [phone]

## 2022-01-06 NOTE — PLAN OF CARE
"/71 (BP Location: Left arm)   Pulse 76   Temp 98.4  F (36.9  C) (Oral)   Resp 16   Ht 1.676 m (5' 6\")   Wt 67.1 kg (148 lb)   SpO2 97%   BMI 23.89 kg/m      Neuros: A&Ox4; [place] speaking but speaks some english.    Cardiac: WNL. Denies chest pain.   Respiratory: sats mid 90s on RA.  Denies any SOB.   GI/: no BM today.  Voiding adequately, not saving.   IV Access: (R) double lumen PICC. Red lumen infusing TKO with intermittent abx.  Other lumen- hep locked.   Pain: Denies pain, c/o (L) ear discomfort.    Diet: moderate CHO, tolerating well. Pt eating most meals today from room service.   Activity: Up independently.   Labs: B and 234     Plan: Possible home this evening pending BGs. Continue with POC.    Patient learning at bedside today to do diabetes education. Diabetic supplies picked up from discharge pharmacy and given to patient.        "

## 2022-01-06 NOTE — PLAN OF CARE
Pt is ready to be discharged home following hospital stay for:    Vitals stable.  Oxygen status: on RA  Pt tolerating diet: Moderate CHO.     Belongings sent home with patient. IV site discontinued. Discharge medications to discharge pharmacy. AVS and education gone over with patient; signed copy in patient chart. Pt to follow up as outpatient and with PCP. Pt verbalized understanding of all education and information.

## 2022-01-09 LAB
BACTERIA FLD CULT: ABNORMAL
BACTERIA FLD CULT: ABNORMAL

## 2022-01-11 ENCOUNTER — VIRTUAL VISIT (OUTPATIENT)
Dept: PHARMACY | Facility: CLINIC | Age: 42
End: 2022-01-11
Attending: FAMILY MEDICINE
Payer: COMMERCIAL

## 2022-01-11 DIAGNOSIS — H70.92 MASTOIDITIS OF LEFT SIDE: ICD-10-CM

## 2022-01-11 DIAGNOSIS — M79.2 NERVE PAIN: ICD-10-CM

## 2022-01-11 DIAGNOSIS — R73.9 HYPERGLYCEMIA: Primary | ICD-10-CM

## 2022-01-11 PROCEDURE — 99605 MTMS BY PHARM NP 15 MIN: CPT | Performed by: PHARMACIST

## 2022-01-11 PROCEDURE — 99607 MTMS BY PHARM ADDL 15 MIN: CPT | Performed by: PHARMACIST

## 2022-01-11 NOTE — PATIENT INSTRUCTIONS
Recommendations from today's MTM visit:                                                    MTM (medication therapy management) is a service provided by a clinical pharmacist designed to help you get the most of out of your medicines.   Today we reviewed what your medicines are for, how to know if they are working, that your medicines are safe and how to make your medicine regimen as easy as possible.      1. Discuss initiation of rosuvastatin 5 mg with new primary care provider as it is recommended for all patients with diabetes to prevent heart attacks.  2.  Discussed initiation of metformin 500 mg to be increased to 2 g daily over a couple of weeks.  This is to help with blood sugars.  Also discussed GLP-1 agonist therapy, such as Trulicity, with new primary care provider.    Follow-up: Return in 24 days (on 2/4/2022) for Follow up, with me, using a phone visit.    It was great to speak with you today.  I value your experience and would be very thankful for your time with providing feedback on our clinic survey. You may receive a survey via email or text message in the next few days.     To schedule another MTM appointment, please call the clinic directly or you may call the MTM scheduling line at [phone] or toll-free at [phone].     My Clinical Pharmacist's contact information:                                                      Please feel free to contact me with any questions or concerns you have.      Sergio Nobles, Pharm. D., [facility]  Medication Therapy Management Pharmacist  Direct Voicemail: [phone]

## 2022-01-19 ENCOUNTER — OFFICE VISIT (OUTPATIENT)
Dept: INTERNAL MEDICINE | Facility: CLINIC | Age: 42
End: 2022-01-19
Payer: COMMERCIAL

## 2022-01-19 VITALS
BODY MASS INDEX: 21.81 KG/M2 | OXYGEN SATURATION: 99 % | HEIGHT: 66 IN | SYSTOLIC BLOOD PRESSURE: 112 MMHG | WEIGHT: 135.7 LBS | HEART RATE: 116 BPM | DIASTOLIC BLOOD PRESSURE: 72 MMHG

## 2022-01-19 DIAGNOSIS — Z79.4 TYPE 2 DIABETES MELLITUS WITH OTHER SPECIFIED COMPLICATION, WITH LONG-TERM CURRENT USE OF INSULIN (H): ICD-10-CM

## 2022-01-19 DIAGNOSIS — R63.4 WEIGHT LOSS: ICD-10-CM

## 2022-01-19 DIAGNOSIS — H70.92 MASTOIDITIS OF LEFT SIDE: ICD-10-CM

## 2022-01-19 DIAGNOSIS — E11.69 TYPE 2 DIABETES MELLITUS WITH OTHER SPECIFIED COMPLICATION, WITH LONG-TERM CURRENT USE OF INSULIN (H): ICD-10-CM

## 2022-01-19 DIAGNOSIS — Z23 HIGH PRIORITY FOR 2019-NCOV VACCINE: Primary | ICD-10-CM

## 2022-01-19 DIAGNOSIS — U07.1 INFECTION DUE TO 2019 NOVEL CORONAVIRUS: ICD-10-CM

## 2022-01-19 PROBLEM — E11.9 DIABETES MELLITUS, TYPE 2 (H): Status: ACTIVE | Noted: 2022-01-19

## 2022-01-19 LAB
ALBUMIN SERPL-MCNC: 3.3 G/DL (ref 3.5–5)
ALP SERPL-CCNC: 88 U/L (ref 45–120)
ALT SERPL W P-5'-P-CCNC: 22 U/L (ref 0–45)
ANION GAP SERPL CALCULATED.3IONS-SCNC: 11 MMOL/L (ref 5–18)
AST SERPL W P-5'-P-CCNC: 16 U/L (ref 0–40)
BILIRUB SERPL-MCNC: 0.3 MG/DL (ref 0–1)
BUN SERPL-MCNC: 23 MG/DL (ref 8–22)
CALCIUM SERPL-MCNC: 9.8 MG/DL (ref 8.5–10.5)
CHLORIDE BLD-SCNC: 101 MMOL/L (ref 98–107)
CO2 SERPL-SCNC: 26 MMOL/L (ref 22–31)
CREAT SERPL-MCNC: 0.78 MG/DL (ref 0.7–1.3)
CREAT UR-MCNC: 41 MG/DL
GFR SERPL CREATININE-BSD FRML MDRD: >90 ML/MIN/1.73M2
GLUCOSE BLD-MCNC: 299 MG/DL (ref 70–125)
HBA1C MFR BLD: 13.8 % (ref 0–5.6)
HIV 1+2 AB+HIV1 P24 AG SERPL QL IA: NEGATIVE
MICROALBUMIN UR-MCNC: <0.5 MG/DL (ref 0–1.99)
MICROALBUMIN/CREAT UR: NORMAL MG/G{CREAT}
POTASSIUM BLD-SCNC: 4.3 MMOL/L (ref 3.5–5)
PROT SERPL-MCNC: 7.1 G/DL (ref 6–8)
SODIUM SERPL-SCNC: 138 MMOL/L (ref 136–145)

## 2022-01-19 PROCEDURE — 80053 COMPREHEN METABOLIC PANEL: CPT | Performed by: INTERNAL MEDICINE

## 2022-01-19 PROCEDURE — 0051A COVID-19,PF,PFIZER (12+ YRS): CPT | Performed by: INTERNAL MEDICINE

## 2022-01-19 PROCEDURE — 82043 UR ALBUMIN QUANTITATIVE: CPT | Performed by: INTERNAL MEDICINE

## 2022-01-19 PROCEDURE — 99204 OFFICE O/P NEW MOD 45 MIN: CPT | Mod: 25 | Performed by: INTERNAL MEDICINE

## 2022-01-19 PROCEDURE — 87389 HIV-1 AG W/HIV-1&-2 AB AG IA: CPT | Performed by: INTERNAL MEDICINE

## 2022-01-19 PROCEDURE — 36415 COLL VENOUS BLD VENIPUNCTURE: CPT | Performed by: INTERNAL MEDICINE

## 2022-01-19 PROCEDURE — 91305 COVID-19,PF,PFIZER (12+ YRS): CPT | Performed by: INTERNAL MEDICINE

## 2022-01-19 PROCEDURE — 86803 HEPATITIS C AB TEST: CPT | Performed by: INTERNAL MEDICINE

## 2022-01-19 PROCEDURE — 83036 HEMOGLOBIN GLYCOSYLATED A1C: CPT | Performed by: INTERNAL MEDICINE

## 2022-01-19 RX ORDER — ROSUVASTATIN CALCIUM 5 MG/1
5 TABLET, COATED ORAL DAILY
Qty: 90 TABLET | Refills: 3 | Status: SHIPPED | OUTPATIENT
Start: 2022-01-19

## 2022-01-19 ASSESSMENT — MIFFLIN-ST. JEOR: SCORE: 1453.28

## 2022-01-19 NOTE — LETTER
January 30, 2022      Mahsa Torrez  [address]    [place] [address]        Dear ,    We are writing to inform you of your test results.    I apologize for the delay . Results were reviewed immediately   The electrolytes, kidney tests are all normal .   Liver tests are normal . The diabetic test is still above 13 as it was in the hospital . Target should be less than 8 .   Resulted Orders   Comprehensive metabolic panel   Result Value Ref Range    Sodium 138 136 - 145 mmol/L    Potassium 4.3 3.5 - 5.0 mmol/L    Chloride 101 98 - 107 mmol/L    Carbon Dioxide (CO2) 26 22 - 31 mmol/L    Anion Gap 11 5 - 18 mmol/L    Urea Nitrogen 23 (H) 8 - 22 mg/dL    Creatinine 0.78 0.70 - 1.30 mg/dL    Calcium 9.8 8.5 - 10.5 mg/dL    Glucose 299 (H) 70 - 125 mg/dL    Alkaline Phosphatase 88 45 - 120 U/L    AST 16 0 - 40 U/L    ALT 22 0 - 45 U/L    Protein Total 7.1 6.0 - 8.0 g/dL    Albumin 3.3 (L) 3.5 - 5.0 g/dL    Bilirubin Total 0.3 0.0 - 1.0 mg/dL    GFR Estimate >90 >60 mL/min/1.73m2      Comment:      Effective December 21, 2021 eGFRcr in adults is calculated using the 2021 CKD-EPI creatinine equation which includes age and gender (Halley et al., NE, DOI: 10.1056/JUVErn0133576)   Hemoglobin A1c   Result Value Ref Range    Hemoglobin A1C 13.8 (H) 0.0 - 5.6 %      Comment:      Normal <5.7%   Prediabetes 5.7-6.4%    Diabetes 6.5% or higher     Note: Adopted from ADA consensus guidelines.   Albumin Random Urine Quantitative with Creat Ratio   Result Value Ref Range    Microalbumin Urine mg/dL <0.50 0.00 - 1.99 mg/dL    Creatinine Urine mg/dL 41 mg/dL    Microalbumin Urine mg/g Cr        Comment:      Unable to calculate:  Urine creatinine or microalbumin value below detectable level    Narrative    Microalbumin, Random Urine   <2.0 mg/dL . . . . . . . . Normal   3.0-30.0 mg/dL . . . . . . Microalbuminuria   >30.0 mg/dL . . . . . .  . Clinical Proteinuria     Microalbumin/Creatinine Ratio, Random Urine    <20 mg/g . . . . .. . . . Normal    mg/g . . . . . . . Microalbuminuria   >300 mg/g . . . . . . . . Clinical Proteinuria   Hepatitis C antibody   Result Value Ref Range    Hepatitis C Antibody Negative Negative    Narrative    Assay performance characteristics have not been established for newborns, infants, children (<18 years) or populations of immunocompromised or immunosuppressed patients.    HIV Antigen Antibody Combo   Result Value Ref Range    HIV Antigen Antibody Combo Negative Negative       If you have any questions or concerns, please call the clinic at the number listed above.       Sincerely,      Chiquis Vera MD

## 2022-01-19 NOTE — PATIENT INSTRUCTIONS
Increase Lantus to 35 units . This is the once a day insulin at bedtime   Start a new medication ( you have to add it )   For diabetes called metformin twice a day with meals ( if it causes stomach side effects let us know )    another medication called cresotr/rosuvasttin for cholesterol was sent to protect you from heart attack and stroke    you have to meed with diabetic teaching nurse who will monitor your blood sugars and teach you more about diet and adjust your sugars in 2-3 weeks   Then see me in 6 weeks   A new medication called jardiacne or empifglazon  Was sent . ONLY  if it is coverd by insurance . If it is it is good to take with all the other medications . If not it is OK we will continue with insulin and metformin   CT scan of the abdomen to see if you have any cancer or other reason for the high blood sugar and weight loss   It willl take weeks for your blood sugar and ear drum to heal

## 2022-01-19 NOTE — LETTER
[facility]  [address] [place] [address]  [phone]          January 19, 2022    RE:  Mahsa Torrez                                                                                                                                                       [address]    [place] [address]            To whom it may concern:    Mahsa Torrez is under my professional care for    High priority for 2019-nCoV vaccine  Mastoiditis of left side  Type 2 diabetes mellitus with other specified complication, with long-term current use of insulin (H)  Weight loss He  may return to work on Jan 29th with the following:   He should be excuse from work from jan 15th to jan 29th   He can return on jan 29th with no night time shift work for three months  . He can work full day shift up until then .     Sincerely,        Chiquis Vera MD

## 2022-01-19 NOTE — PROGRESS NOTES
SUBJECTIVE:   CC: Mahsa Torrez is an 42 year old male who presents for hospital follow up Has had no prior h/o chronic disease but after a trip to [place] adhearing is normal mitted to the hospital with fever , ear infection . CT head showed mastoiditis . Was on IV antibiotics then oral and ear drops . He says he has completed . them . He says there is no ear pain or loss of hearing . He missed his follow up ENT visit . He was found to incidentally have hyperglycemia , diabetes without ketoacidosis and had a covid infection without desaturation or pneumonia . He was given IV remdesivir . He continues to have significant fatigue . He is compliant with his insulin, discharged on insulin alone with gabapentin for some feet neuropathy pain   BS at home have been high in the 300 range . He has been taking novolog 8 units three times a day and 25 units lantus .   Family history of IHD, diabetes , stroke no cancer   Smokes social [employer]   No alcohol   Works in [employer] .   Current Outpatient Medications   Medication     blood glucose (NO BRAND SPECIFIED) lancets standard     blood glucose (NO BRAND SPECIFIED) test strip     blood glucose monitoring (NO BRAND SPECIFIED) meter device kit     ciprofloxacin-dexamethasone (CIPRODEX) 0.3-0.1 % otic suspension         gabapentin (NEURONTIN) 300 MG capsule     insulin aspart (NOVOLOG FLEXPEN) 100 UNIT/ML pen     insulin glargine (LANTUS PEN) 100 UNIT/ML pen     insulin pen needle (32G X 4 MM) 32G X 4 MM miscellaneous              [employer] MISC     No current facility-administered medications for this visit.         Today's PHQ-2 Score:   PHQ-2 ( 1999 Pfizer) 1/19/2022   Q1: Little interest or pleasure in doing things 0   Q2: Feeling down, depressed or hopeless 0   PHQ-2 Score 0           Social History     Tobacco Use     Smoking status: Former Smoker     Smokeless tobacco: Never Used   Substance Use Topics     Alcohol use: Not on file         No flowsheet data  "found.    Last PSA: No results found for: PSA    Reviewed orders with patient. Reviewed health maintenance and updated orders accordingly - Yes  Lab work is in process    Reviewed and updated as needed this visit by clinical staff  Tobacco  Allergies  Meds             Reviewed and updated as needed this visit by Provider                   Review of Systems  CONSTITUTIONAL: NEGATIVE for fever, chills, change in weight  INTEGUMENTARY/SKIN: NEGATIVE for worrisome rashes, moles or lesions  EYES: NEGATIVE for vision changes or irritation  ENT: NEGATIVE for ear, mouth and throat problems  RESP: NEGATIVE for significant cough or SOB  CV: NEGATIVE for chest pain, palpitations or peripheral edema  GI: NEGATIVE for nausea, abdominal pain, heartburn, or change in bowel habits   male: negative for dysuria, hematuria, decreased urinary stream, erectile dysfunction, urethral discharge  MUSCULOSKELETAL: NEGATIVE for significant arthralgias or myalgia  NEURO: NEGATIVE for weakness, dizziness or paresthesias  PSYCHIATRIC: NEGATIVE for changes in mood or affect    OBJECTIVE:   /72 (BP Location: Left arm, Patient Position: Sitting, Cuff Size: Adult Regular)   Pulse 116   Ht 1.676 m (5' 6\")   Wt 61.6 kg (135 lb 11.2 oz)   SpO2 99%   BMI 21.90 kg/m      Physical Exam  GENERAL: healthy, alert and no distress  EYES: Eyes grossly normal to inspection, PERRL and conjunctivae and sclerae normal  HENT: ear canals and TM's normal, nose and mouth without ulcers or lesions  NECK: no adenopathy, no asymmetry, masses, or scars and thyroid normal to palpation  RESP: lungs clear to auscultation - no rales, rhonchi or wheezes  CV: regular rate and rhythm, normal S1 S2, no S3 or S4, no murmur, click or rub, no peripheral edema and peripheral pulses strong  ABDOMEN: soft, nontender, no hepatosplenomegaly, no masses and bowel sounds normal  MS: no gross musculoskeletal defects noted, no edema  SKIN: no suspicious lesions or " rashes  NEURO: Normal strength and tone, mentation intact and speech normal  PSYCH: mentation appears normal, affect normal/bright    Diagnostic Test Results:  Labs reviewed in Epic    ASSESSMENT/PLAN:   (Z23) High priority for 2019-nCoV vaccine  (primary encounter diagnosis)  Comment: as been 2 weeks out . Can get vaccine and repeat in 3 weeks   Plan: COVID-19,PF,PFIZER (12+ Yrs GRAY LABEL)            (H70.92) Mastoiditis of left side  Comment:   Plan: Otolaryngology Referral        Refer back to ENT . Left ear drum is ruptured but no discharge .    (E11.69,  Z79.4) Type 2 diabetes mellitus with other specified complication, with long-term current use of insulin (H)  Comment: addition of jardiacne along with increasing metformin , adding low dose statin . Referral to diabetes education for liam and carb counting . Will increase lantus ti 35 units . It may be that jardiance is not covered . I do not think GLP agonist would be next step given that he already losing weight . New onset diabetes and weight loss could represent pancreatic disease . Will get CT scan abdomen   Plan: empagliflozin (JARDIANCE) 10 MG TABS tablet,         metFORMIN (GLUCOPHAGE) 500 MG tablet,         rosuvastatin (CRESTOR) 5 MG tablet,         Comprehensive metabolic panel, Hemoglobin A1c,         CT Abdomen Pelvis w/o Contrast, AMB Adult         Diabetes Educator Referral, Albumin Random         Urine Quantitative with Creat Ratio            (R63.4) Weight loss  Comment:   Plan: Hepatitis C antibody, HIV Antigen Antibody         Combo            (U07.1) Infection due to 2019 novel coronavirus  Comment: recovered with no end organ sequelae   Plan: he can get first dose of vaccine today     Discuss other vaccines at next visit     COUNSELING:   Reviewed preventive health counseling, as reflected in patient instructions    Estimated body mass index is 21.9 kg/m  as calculated from the following:    Height as of this encounter: 1.676 m (5'  "6\").    Weight as of this encounter: 61.6 kg (135 lb 11.2 oz).       Close follow up . Concerned he may be overhwhelmed with new diagnosis and meds . Language barrier is a factor as well   He reports that he has quit smoking. He has never used smokeless tobacco.      Counseling Resources:  ATP IV Guidelines  Pooled Cohorts Equation Calculator  FRAX Risk Assessment  ICSI Preventive Guidelines  Dietary Guidelines for Americans, 2010  USDA's MyPlate  ASA Prophylaxis  Lung CA Screening    Chiquis Vera MD  [facility]  "

## 2022-01-20 LAB — HCV AB SERPL QL IA: NEGATIVE

## 2022-01-27 DIAGNOSIS — E11.9 TYPE 2 DIABETES MELLITUS WITHOUT COMPLICATION, WITH LONG-TERM CURRENT USE OF INSULIN (H): ICD-10-CM

## 2022-01-27 DIAGNOSIS — Z79.4 TYPE 2 DIABETES MELLITUS WITHOUT COMPLICATION, WITH LONG-TERM CURRENT USE OF INSULIN (H): ICD-10-CM

## 2022-01-27 NOTE — TELEPHONE ENCOUNTER
Refill Request  Medication name: Pending Prescriptions:                       Disp   Refills    blood glucose (NO BRAND SPECIFIED) test s*100 st*0            Sig: Use to test blood sugar 4 times daily or as           directed.    Who prescribed the medication: PCp  Last refill on medication: 1/6/22  Requested Pharmacy: [facility]  Last appointment with PCP: 1/19/22  Next appointment: Not due

## 2022-02-09 ENCOUNTER — IMMUNIZATION (OUTPATIENT)
Dept: NURSING | Facility: CLINIC | Age: 42
End: 2022-02-09
Attending: INTERNAL MEDICINE
Payer: COMMERCIAL

## 2022-02-09 PROCEDURE — 0052A COVID-19,PF,PFIZER (12+ YRS): CPT

## 2022-02-09 PROCEDURE — 91305 COVID-19,PF,PFIZER (12+ YRS): CPT

## 2022-02-25 ENCOUNTER — OFFICE VISIT (OUTPATIENT)
Dept: INTERNAL MEDICINE | Facility: CLINIC | Age: 42
End: 2022-02-25
Payer: COMMERCIAL

## 2022-02-25 ENCOUNTER — TELEPHONE (OUTPATIENT)
Dept: INTERNAL MEDICINE | Facility: CLINIC | Age: 42
End: 2022-02-25

## 2022-02-25 VITALS
HEART RATE: 106 BPM | DIASTOLIC BLOOD PRESSURE: 70 MMHG | OXYGEN SATURATION: 98 % | SYSTOLIC BLOOD PRESSURE: 116 MMHG | BODY MASS INDEX: 22.02 KG/M2 | WEIGHT: 136.4 LBS

## 2022-02-25 DIAGNOSIS — Z79.4 TYPE 2 DIABETES MELLITUS WITH DIABETIC MONONEUROPATHY, WITH LONG-TERM CURRENT USE OF INSULIN (H): Primary | ICD-10-CM

## 2022-02-25 DIAGNOSIS — E11.9 TYPE 2 DIABETES MELLITUS WITHOUT COMPLICATION, WITH LONG-TERM CURRENT USE OF INSULIN (H): ICD-10-CM

## 2022-02-25 DIAGNOSIS — E11.69 TYPE 2 DIABETES MELLITUS WITH OTHER SPECIFIED COMPLICATION, WITH LONG-TERM CURRENT USE OF INSULIN (H): ICD-10-CM

## 2022-02-25 DIAGNOSIS — G57.93 NEUROPATHIC PAIN OF BOTH FEET: ICD-10-CM

## 2022-02-25 DIAGNOSIS — E11.41 TYPE 2 DIABETES MELLITUS WITH DIABETIC MONONEUROPATHY, WITH LONG-TERM CURRENT USE OF INSULIN (H): Primary | ICD-10-CM

## 2022-02-25 DIAGNOSIS — Z79.4 TYPE 2 DIABETES MELLITUS WITHOUT COMPLICATION, WITH LONG-TERM CURRENT USE OF INSULIN (H): ICD-10-CM

## 2022-02-25 DIAGNOSIS — Z79.4 TYPE 2 DIABETES MELLITUS WITH OTHER SPECIFIED COMPLICATION, WITH LONG-TERM CURRENT USE OF INSULIN (H): ICD-10-CM

## 2022-02-25 PROCEDURE — 99214 OFFICE O/P EST MOD 30 MIN: CPT | Performed by: INTERNAL MEDICINE

## 2022-02-25 RX ORDER — GABAPENTIN 300 MG/1
CAPSULE ORAL
Qty: 180 CAPSULE | Refills: 3 | Status: SHIPPED | OUTPATIENT
Start: 2022-02-25 | End: 2022-02-28

## 2022-02-25 RX ORDER — LIDOCAINE 40 MG/G
CREAM TOPICAL
Qty: 45 G | Refills: 3 | Status: SHIPPED | OUTPATIENT
Start: 2022-02-25

## 2022-02-25 RX ORDER — INSULIN ASPART 100 [IU]/ML
INJECTION, SOLUTION INTRAVENOUS; SUBCUTANEOUS
Qty: 15 ML | Refills: 11 | Status: SHIPPED | OUTPATIENT
Start: 2022-02-25

## 2022-02-25 NOTE — TELEPHONE ENCOUNTER
Reason for Call:  Other prescription-pharm calling with questions on meds    Detailed comments: calling with needing direction clarification on meds.    Looks like they are missing part of the directions.  gabapentin (NEURONTIN) 300 MG capsule 180 capsule 3 2022  No   Si morning , afternoon , and nighttime 600mg at night       Is this a new script?  Pt has never picked up before.and its an increase in dosage.  empagliflozin (JARDIANCE) 25 MG TABS tablet 90 tablet 3 2022  --   Sig - Route: Take 1 tablet (25 mg) by mouth daily - Oral     Ok to call with new directions to pharm.  [phone]        Can we leave a detailed message on this number? YES    Call taken on 2022 at 4:18 PM by Pam J. Behr

## 2022-02-25 NOTE — PROGRESS NOTES
Assessment & Plan     Type 2 diabetes mellitus with diabetic mononeuropathy, with long-term current use of insulin (H)  Average blood sugars was in the 300s now gone down to the 140s with regular insulin at the addition of Jardiance.  Recommend to increase Jardiance to 25 mg.  Increase Metformin to 2000 mg a day continue insulin as previously done at 35 units a day Lantus.  I am not sure if he was taking the Crestor did tell him to pick it up if he has not started it.  Blood pressure is controlled he is not on an ARB or ACE will consider low-dose at some point  Too early to check A1c, he is seeing the teaching nurse who can do the diagnostic continues glucose monitoring assessment  Recheck A1c in 2 months.  Type 2 diabetes mellitus with other specified complication, with long-term current use of insulin (H)  Recommend eye exam  - OPTOMETRY REFERRAL  - metFORMIN (GLUCOPHAGE) 500 MG tablet  Dispense: 180 tablet; Refill: 3  - empagliflozin (JARDIANCE) 25 MG TABS tablet  Dispense: 90 tablet; Refill: 3  - lidocaine (LMX4) 4 % external cream  Dispense: 45 g; Refill: 3    Type 2 diabetes mellitus without complication, with long-term current use of insulin (H)    - insulin pen needle (32G X 4 MM) 32G X 4 MM miscellaneous  Dispense: 100 each; Refill: 0  - insulin glargine (LANTUS PEN) 100 UNIT/ML pen  Dispense: 15 mL; Refill: 11  - insulin aspart (NOVOLOG FLEXPEN) 100 UNIT/ML pen  Dispense: 15 mL; Refill: 11  - blood glucose monitoring (NO BRAND SPECIFIED) meter device kit  Dispense: 1 kit; Refill: 0  - blood glucose (NO BRAND SPECIFIED) test strip  Dispense: 400 strip; Refill: 1  - blood glucose (NO BRAND SPECIFIED) lancets standard  Dispense: 100 lancet; Refill: 0    Neuropathic pain of both feet  I do believe he has diabetic neuropathy complicated from an old foot injury especially in the right side his monofilament testing is actually normal peripheral pulses are slightly diminished.  He can take more gabapentin at  night 600 mg continue 300+300 in the day and use topical lidocaine cream.  - gabapentin (NEURONTIN) 300 MG capsule  Dispense: 180 capsule; Refill: 3               Next visit consider doing his hepatitis base vaccination or will check his serology first pneumococcal will be needed for his diabetic risk and his third shot COVID.    Return in about 3 months (around 5/25/2022).    Chiquis Vera MD  [facility]    Debby Bragg is a 42 year old who presents for the following health issues pleasant patient recently established with new onset diagnosis of type 2 diabetes poorly controlled with hyperglycemia and neuropathy he also had Covid infection.  He had no sequelae of the Covid infection and was not hypoxemic.  He was started on insulin and Jardiance was added Metformin was added and statin was added he is tolerating all his meds and is reported his blood sugars have improved but he is ongoing right foot pain is in the mid fifth foot intermittent and it can sometimes wake him up at night it is not weightbearing or increased with exercise.    HPI     Patient Active Problem List   Diagnosis     Hyperglycemia     Non-recurrent acute suppurative otitis media of left ear with spontaneous rupture of tympanic membrane     COVID-19 virus antibody detected     Mastoiditis of left side     Diabetes mellitus, type 2 (H)     Current Outpatient Medications   Medication     blood glucose (NO BRAND SPECIFIED) lancets standard     blood glucose (NO BRAND SPECIFIED) test strip     blood glucose monitoring (NO BRAND SPECIFIED) meter device kit     ciprofloxacin-dexamethasone (CIPRODEX) 0.3-0.1 % otic suspension     empagliflozin (JARDIANCE) 10 MG TABS tablet     empagliflozin (JARDIANCE) 25 MG TABS tablet     gabapentin (NEURONTIN) 300 MG capsule     insulin aspart (NOVOLOG FLEXPEN) 100 UNIT/ML pen     insulin glargine (LANTUS PEN) 100 UNIT/ML pen     insulin pen needle (32G X 4 MM) 32G X 4 MM  miscellaneous     lidocaine (LMX4) 4 % external cream     metFORMIN (GLUCOPHAGE) 500 MG tablet     rosuvastatin (CRESTOR) 5 MG tablet     [employer] MISC     No current facility-administered medications for this visit.           Review of Systems   Constitutional, HEENT, cardiovascular, pulmonary, gi and gu systems are negative, except as otherwise noted.      Objective    /70 (BP Location: Left arm, Patient Position: Sitting, Cuff Size: Adult Regular)   Pulse 106   Wt 61.9 kg (136 lb 6.4 oz)   SpO2 98%   BMI 22.02 kg/m    Body mass index is 22.02 kg/m .  Physical Exam   GENERAL: healthy, alert and no distress  NECK: no adenopathy, no asymmetry, masses, or scars and thyroid normal to palpation  RESP: lungs clear to auscultation - no rales, rhonchi or wheezes  CV: regular rate and rhythm, normal S1 S2, no S3 or S4, no murmur, click or rub, no peripheral edema and peripheral pulses strong  Feet: Inspection is normal peripheral pulses are slightly diminished monofilament testing is normal there is no ulcerations or deformity there is no point tenderness over his feet there is no edema

## 2022-02-28 DIAGNOSIS — G57.93 NEUROPATHIC PAIN OF BOTH FEET: ICD-10-CM

## 2022-02-28 RX ORDER — GABAPENTIN 300 MG/1
CAPSULE ORAL
Qty: 120 CAPSULE | Refills: 3 | Status: SHIPPED | OUTPATIENT
Start: 2022-02-28

## 2022-02-28 NOTE — TELEPHONE ENCOUNTER
I will resend the gabapentin with correct orders. Patient told me he had picked up jardiacne . Please let him know he has to pick this medication up unless insurance doenst cover it and to bring all his meds to the diabetic teaching nurse visit

## 2022-03-02 NOTE — TELEPHONE ENCOUNTER
Contacted patient via [employer]  and relayed message below.  Patient states he was called by pharmacy and will  medications.  He also asks for time and and address of DM educator apt tomorrow, informed it is at 1 pm and writer did provide address for [facility].  Patient was able to repeat back correct address via .

## 2022-03-25 ENCOUNTER — NURSE TRIAGE (OUTPATIENT)
Dept: NURSING | Facility: CLINIC | Age: 42
End: 2022-03-25
Payer: COMMERCIAL

## 2022-03-25 NOTE — TELEPHONE ENCOUNTER
"RN triage  Call from pt  --- pt declined    Pt states he saw  last month and prescribed gabapentin -- taking gabapentin  Pt states it is helping a lot  Sometimes still has burning feet -- but mostly much better  Now having cold / \"freezing' feet --- wears socks   No swelling --- no color change -- not red - not blue   Also pt states sometimes he is not walking normal due to feet and sometimes when driving and using brake pedal = feet painful   Pt wants to see PCP again     Transfer to      Brenda Stinson RN  [facility]  Triage Nurse Advisor    COVID 19 Nurse Triage Plan/Patient Instructions    Please be aware that novel coronavirus (COVID-19) may be circulating in the community. If you develop symptoms such as fever, cough, or SOB or if you have concerns about the presence of another infection including coronavirus (COVID-19), please contact your health care provider or visit [email or web address].     Disposition/Instructions    In-Person Visit with provider recommended. Reference Visit Selection Guide.    Thank you for taking steps to prevent the spread of this virus.  o Limit your contact with others.  o Wear a simple mask to cover your cough.  o Wash your hands well and often.    Resources    [facility]: About COVID-19: [email or web address]    CDC: What to Do If You're Sick: www.cdc.gov/coronavirus/2019-ncov/about/steps-when-sick.html    CDC: Ending Home Isolation: www.cdc.gov/coronavirus/2019-ncov/hcp/disposition-in-home-patients.html     CDC: Caring for Someone: www.cdc.gov/coronavirus/2019-ncov/if-you-are-sick/care-for-someone.html     [facility]: Interim Guidance for Hospital Discharge to Home: www.health.[facility].[place]/diseases/coronavirus/hcp/hospdischarge.pdf    [facility] clinical trials (COVID-19 research studies): [email or web address]/umn-clinical-trials     Below are the COVID-19 hotlines at the [place] Department of Health ([facility]). Interpreters are " available.   o For health questions: Call 349-293-6851 or 1-541.523.9520 (7 a.m. to 7 p.m.)  o For questions about schools and childcare: Call 185-972-1816 or 1-355.423.8839 (7 a.m. to 7 p.m.)         Additional Information    Nursing judgment    Protocols used: NO PROTOCOL AVAILABLE - SICK ADULT-A-[place]